# Patient Record
Sex: FEMALE | Race: WHITE | NOT HISPANIC OR LATINO | Employment: FULL TIME | URBAN - METROPOLITAN AREA
[De-identification: names, ages, dates, MRNs, and addresses within clinical notes are randomized per-mention and may not be internally consistent; named-entity substitution may affect disease eponyms.]

---

## 2024-03-05 ENCOUNTER — HOSPITAL ENCOUNTER (OUTPATIENT)
Facility: HOSPITAL | Age: 44
Setting detail: OBSERVATION
Discharge: HOME | End: 2024-03-09
Attending: EMERGENCY MEDICINE | Admitting: HOSPITALIST
Payer: COMMERCIAL

## 2024-03-05 ENCOUNTER — APPOINTMENT (OUTPATIENT)
Dept: CT IMAGING | Facility: HOSPITAL | Age: 44
End: 2024-03-05
Payer: COMMERCIAL

## 2024-03-05 DIAGNOSIS — R93.89 ABNORMAL CT SCAN: ICD-10-CM

## 2024-03-05 DIAGNOSIS — K57.92 DIVERTICULITIS: Primary | ICD-10-CM

## 2024-03-05 DIAGNOSIS — M54.50 ACUTE LEFT-SIDED LOW BACK PAIN, UNSPECIFIED WHETHER SCIATICA PRESENT: ICD-10-CM

## 2024-03-05 DIAGNOSIS — R10.30 LOWER ABDOMINAL PAIN: ICD-10-CM

## 2024-03-05 PROBLEM — F32.A DEPRESSION: Status: ACTIVE | Noted: 2024-03-05

## 2024-03-05 PROBLEM — R10.32 LLQ ABDOMINAL PAIN: Status: ACTIVE | Noted: 2024-03-05

## 2024-03-05 LAB
ALBUMIN SERPL-MCNC: 3.8 G/DL (ref 3.5–5.2)
ALBUMIN/GLOB SERPL: 1.4 G/DL
ALP SERPL-CCNC: 94 U/L (ref 39–117)
ALT SERPL W P-5'-P-CCNC: 20 U/L (ref 1–33)
ANION GAP SERPL CALCULATED.3IONS-SCNC: 10 MMOL/L (ref 5–15)
AST SERPL-CCNC: 13 U/L (ref 1–32)
BACTERIA UR QL AUTO: ABNORMAL /HPF
BASOPHILS # BLD AUTO: 0.05 10*3/MM3 (ref 0–0.2)
BASOPHILS NFR BLD AUTO: 0.5 % (ref 0–1.5)
BILIRUB SERPL-MCNC: 0.3 MG/DL (ref 0–1.2)
BILIRUB UR QL STRIP: NEGATIVE
BUN SERPL-MCNC: 5 MG/DL (ref 6–20)
BUN/CREAT SERPL: 6.6 (ref 7–25)
CALCIUM SPEC-SCNC: 9.4 MG/DL (ref 8.6–10.5)
CHLORIDE SERPL-SCNC: 103 MMOL/L (ref 98–107)
CLARITY UR: CLEAR
CO2 SERPL-SCNC: 25 MMOL/L (ref 22–29)
COLOR UR: YELLOW
CREAT SERPL-MCNC: 0.76 MG/DL (ref 0.57–1)
DEPRECATED RDW RBC AUTO: 43.4 FL (ref 37–54)
EGFRCR SERPLBLD CKD-EPI 2021: 99.9 ML/MIN/1.73
EOSINOPHIL # BLD AUTO: 0.24 10*3/MM3 (ref 0–0.4)
EOSINOPHIL NFR BLD AUTO: 2.5 % (ref 0.3–6.2)
ERYTHROCYTE [DISTWIDTH] IN BLOOD BY AUTOMATED COUNT: 12.7 % (ref 12.3–15.4)
GLOBULIN UR ELPH-MCNC: 2.8 GM/DL
GLUCOSE SERPL-MCNC: 113 MG/DL (ref 65–99)
GLUCOSE UR STRIP-MCNC: NEGATIVE MG/DL
HCG SERPL QL: NEGATIVE
HCT VFR BLD AUTO: 37.4 % (ref 34–46.6)
HGB BLD-MCNC: 12.5 G/DL (ref 12–15.9)
HGB UR QL STRIP.AUTO: NEGATIVE
HYALINE CASTS UR QL AUTO: ABNORMAL /LPF
IMM GRANULOCYTES # BLD AUTO: 0.07 10*3/MM3 (ref 0–0.05)
IMM GRANULOCYTES NFR BLD AUTO: 0.7 % (ref 0–0.5)
KETONES UR QL STRIP: NEGATIVE
LEUKOCYTE ESTERASE UR QL STRIP.AUTO: ABNORMAL
LIPASE SERPL-CCNC: 13 U/L (ref 13–60)
LYMPHOCYTES # BLD AUTO: 1.42 10*3/MM3 (ref 0.7–3.1)
LYMPHOCYTES NFR BLD AUTO: 14.9 % (ref 19.6–45.3)
MCH RBC QN AUTO: 31.3 PG (ref 26.6–33)
MCHC RBC AUTO-ENTMCNC: 33.4 G/DL (ref 31.5–35.7)
MCV RBC AUTO: 93.5 FL (ref 79–97)
MONOCYTES # BLD AUTO: 0.49 10*3/MM3 (ref 0.1–0.9)
MONOCYTES NFR BLD AUTO: 5.1 % (ref 5–12)
NEUTROPHILS NFR BLD AUTO: 7.29 10*3/MM3 (ref 1.7–7)
NEUTROPHILS NFR BLD AUTO: 76.3 % (ref 42.7–76)
NITRITE UR QL STRIP: NEGATIVE
NRBC BLD AUTO-RTO: 0 /100 WBC (ref 0–0.2)
PH UR STRIP.AUTO: 8.5 [PH] (ref 5–8)
PLATELET # BLD AUTO: 388 10*3/MM3 (ref 140–450)
PMV BLD AUTO: 9.8 FL (ref 6–12)
POTASSIUM SERPL-SCNC: 3.9 MMOL/L (ref 3.5–5.2)
PROT SERPL-MCNC: 6.6 G/DL (ref 6–8.5)
PROT UR QL STRIP: NEGATIVE
RBC # BLD AUTO: 4 10*6/MM3 (ref 3.77–5.28)
RBC # UR STRIP: ABNORMAL /HPF
REF LAB TEST METHOD: ABNORMAL
SODIUM SERPL-SCNC: 138 MMOL/L (ref 136–145)
SP GR UR STRIP: 1.01 (ref 1–1.03)
SQUAMOUS #/AREA URNS HPF: ABNORMAL /HPF
UROBILINOGEN UR QL STRIP: ABNORMAL
WBC # UR STRIP: ABNORMAL /HPF
WBC NRBC COR # BLD AUTO: 9.56 10*3/MM3 (ref 3.4–10.8)

## 2024-03-05 PROCEDURE — 99285 EMERGENCY DEPT VISIT HI MDM: CPT

## 2024-03-05 PROCEDURE — 81001 URINALYSIS AUTO W/SCOPE: CPT | Performed by: EMERGENCY MEDICINE

## 2024-03-05 PROCEDURE — 25010000002 MORPHINE PER 10 MG: Performed by: EMERGENCY MEDICINE

## 2024-03-05 PROCEDURE — 74176 CT ABD & PELVIS W/O CONTRAST: CPT

## 2024-03-05 PROCEDURE — 96376 TX/PRO/DX INJ SAME DRUG ADON: CPT

## 2024-03-05 PROCEDURE — 25010000002 ONDANSETRON PER 1 MG: Performed by: EMERGENCY MEDICINE

## 2024-03-05 PROCEDURE — 84703 CHORIONIC GONADOTROPIN ASSAY: CPT | Performed by: EMERGENCY MEDICINE

## 2024-03-05 PROCEDURE — 96375 TX/PRO/DX INJ NEW DRUG ADDON: CPT

## 2024-03-05 PROCEDURE — G0378 HOSPITAL OBSERVATION PER HR: HCPCS

## 2024-03-05 PROCEDURE — 83690 ASSAY OF LIPASE: CPT | Performed by: EMERGENCY MEDICINE

## 2024-03-05 PROCEDURE — 85025 COMPLETE CBC W/AUTO DIFF WBC: CPT | Performed by: EMERGENCY MEDICINE

## 2024-03-05 PROCEDURE — 80053 COMPREHEN METABOLIC PANEL: CPT | Performed by: EMERGENCY MEDICINE

## 2024-03-05 RX ORDER — MORPHINE SULFATE 2 MG/ML
4 INJECTION, SOLUTION INTRAMUSCULAR; INTRAVENOUS ONCE
Status: COMPLETED | OUTPATIENT
Start: 2024-03-05 | End: 2024-03-05

## 2024-03-05 RX ORDER — AMOXICILLIN 250 MG
2 CAPSULE ORAL 2 TIMES DAILY PRN
Status: DISCONTINUED | OUTPATIENT
Start: 2024-03-05 | End: 2024-03-09 | Stop reason: HOSPADM

## 2024-03-05 RX ORDER — DOXYCYCLINE 100 MG/1
100 TABLET ORAL
COMMUNITY
Start: 2024-03-02 | End: 2024-03-09 | Stop reason: HOSPADM

## 2024-03-05 RX ORDER — POLYETHYLENE GLYCOL 3350 17 G/17G
17 POWDER, FOR SOLUTION ORAL DAILY
Status: DISCONTINUED | OUTPATIENT
Start: 2024-03-05 | End: 2024-03-09 | Stop reason: HOSPADM

## 2024-03-05 RX ORDER — HYDROCODONE BITARTRATE AND ACETAMINOPHEN 5; 325 MG/1; MG/1
1 TABLET ORAL
COMMUNITY
Start: 2024-03-02 | End: 2024-04-01

## 2024-03-05 RX ORDER — SODIUM CHLORIDE 9 MG/ML
100 INJECTION, SOLUTION INTRAVENOUS CONTINUOUS
Status: DISCONTINUED | OUTPATIENT
Start: 2024-03-05 | End: 2024-03-07

## 2024-03-05 RX ORDER — PANTOPRAZOLE SODIUM 40 MG/1
40 TABLET, DELAYED RELEASE ORAL
Status: DISCONTINUED | OUTPATIENT
Start: 2024-03-06 | End: 2024-03-09 | Stop reason: HOSPADM

## 2024-03-05 RX ORDER — DICYCLOMINE HCL 20 MG
20 TABLET ORAL EVERY 6 HOURS
COMMUNITY
Start: 2024-03-02 | End: 2024-03-09 | Stop reason: HOSPADM

## 2024-03-05 RX ORDER — MORPHINE SULFATE 2 MG/ML
2 INJECTION, SOLUTION INTRAMUSCULAR; INTRAVENOUS
Status: DISCONTINUED | OUTPATIENT
Start: 2024-03-05 | End: 2024-03-09 | Stop reason: HOSPADM

## 2024-03-05 RX ORDER — ONDANSETRON 2 MG/ML
4 INJECTION INTRAMUSCULAR; INTRAVENOUS ONCE
Status: COMPLETED | OUTPATIENT
Start: 2024-03-05 | End: 2024-03-05

## 2024-03-05 RX ORDER — SODIUM CHLORIDE 9 MG/ML
40 INJECTION, SOLUTION INTRAVENOUS AS NEEDED
Status: DISCONTINUED | OUTPATIENT
Start: 2024-03-05 | End: 2024-03-09 | Stop reason: HOSPADM

## 2024-03-05 RX ORDER — MORPHINE SULFATE 2 MG/ML
4 INJECTION, SOLUTION INTRAMUSCULAR; INTRAVENOUS EVERY 4 HOURS PRN
Status: DISCONTINUED | OUTPATIENT
Start: 2024-03-05 | End: 2024-03-05

## 2024-03-05 RX ORDER — NITROGLYCERIN 0.4 MG/1
0.4 TABLET SUBLINGUAL
Status: DISCONTINUED | OUTPATIENT
Start: 2024-03-05 | End: 2024-03-09 | Stop reason: HOSPADM

## 2024-03-05 RX ORDER — ACETAMINOPHEN 160 MG/5ML
650 SOLUTION ORAL EVERY 4 HOURS PRN
Status: DISCONTINUED | OUTPATIENT
Start: 2024-03-05 | End: 2024-03-09 | Stop reason: HOSPADM

## 2024-03-05 RX ORDER — ACETAMINOPHEN 325 MG/1
650 TABLET ORAL EVERY 4 HOURS PRN
Status: DISCONTINUED | OUTPATIENT
Start: 2024-03-05 | End: 2024-03-09 | Stop reason: HOSPADM

## 2024-03-05 RX ORDER — HYDROCODONE BITARTRATE AND ACETAMINOPHEN 5; 325 MG/1; MG/1
1 TABLET ORAL EVERY 4 HOURS PRN
Status: DISCONTINUED | OUTPATIENT
Start: 2024-03-05 | End: 2024-03-09 | Stop reason: HOSPADM

## 2024-03-05 RX ORDER — PREDNISONE 10 MG/1
TABLET ORAL
Status: ON HOLD | COMMUNITY
Start: 2024-02-08 | End: 2024-03-05

## 2024-03-05 RX ORDER — ACETAMINOPHEN 650 MG/1
650 SUPPOSITORY RECTAL EVERY 4 HOURS PRN
Status: DISCONTINUED | OUTPATIENT
Start: 2024-03-05 | End: 2024-03-09 | Stop reason: HOSPADM

## 2024-03-05 RX ORDER — ONDANSETRON 4 MG/1
4 TABLET, ORALLY DISINTEGRATING ORAL
COMMUNITY
Start: 2024-03-02

## 2024-03-05 RX ORDER — SODIUM CHLORIDE 0.9 % (FLUSH) 0.9 %
10 SYRINGE (ML) INJECTION AS NEEDED
Status: DISCONTINUED | OUTPATIENT
Start: 2024-03-05 | End: 2024-03-09 | Stop reason: HOSPADM

## 2024-03-05 RX ORDER — BISACODYL 10 MG
10 SUPPOSITORY, RECTAL RECTAL DAILY PRN
Status: DISCONTINUED | OUTPATIENT
Start: 2024-03-05 | End: 2024-03-09 | Stop reason: HOSPADM

## 2024-03-05 RX ORDER — SODIUM CHLORIDE 0.9 % (FLUSH) 0.9 %
10 SYRINGE (ML) INJECTION EVERY 12 HOURS SCHEDULED
Status: DISCONTINUED | OUTPATIENT
Start: 2024-03-05 | End: 2024-03-09 | Stop reason: HOSPADM

## 2024-03-05 RX ORDER — BISACODYL 5 MG/1
5 TABLET, DELAYED RELEASE ORAL DAILY PRN
Status: DISCONTINUED | OUTPATIENT
Start: 2024-03-05 | End: 2024-03-09 | Stop reason: HOSPADM

## 2024-03-05 RX ORDER — POLYETHYLENE GLYCOL 3350 17 G/17G
17 POWDER, FOR SOLUTION ORAL DAILY PRN
Status: DISCONTINUED | OUTPATIENT
Start: 2024-03-05 | End: 2024-03-09 | Stop reason: HOSPADM

## 2024-03-05 RX ORDER — DICLOFENAC SODIUM 75 MG/1
75 TABLET, DELAYED RELEASE ORAL
COMMUNITY
Start: 2024-02-22

## 2024-03-05 RX ORDER — METRONIDAZOLE 500 MG/1
500 TABLET ORAL EVERY 8 HOURS SCHEDULED
Status: DISCONTINUED | OUTPATIENT
Start: 2024-03-05 | End: 2024-03-06

## 2024-03-05 RX ADMIN — MORPHINE SULFATE 4 MG: 2 INJECTION, SOLUTION INTRAMUSCULAR; INTRAVENOUS at 17:42

## 2024-03-05 RX ADMIN — Medication 10 ML: at 21:31

## 2024-03-05 RX ADMIN — ONDANSETRON 4 MG: 2 INJECTION INTRAMUSCULAR; INTRAVENOUS at 15:48

## 2024-03-05 RX ADMIN — MORPHINE SULFATE 4 MG: 2 INJECTION, SOLUTION INTRAMUSCULAR; INTRAVENOUS at 15:48

## 2024-03-05 NOTE — ED PROVIDER NOTES
EMERGENCY DEPARTMENT ENCOUNTER  Room Number:  DONA/DONA  PCP: Alissa Bourgeois MD  Independent Historians: Patient      HPI:  Chief Complaint: had concerns including Back Pain and Leg Pain (/).       A complete HPI/ROS/PMH/PSH/SH/FH are unobtainable due to: None    Chronic or social conditions impacting patient care (Social Determinants of Health): None      Context: Sushant Chisholm is a 43 y.o. female with a medical history of diverticulitis who presents to the ED c/o acute left-sided back and left lower quadrant abdominal pain that radiates into her anterior left leg.  She states was present upon waking this morning.  She states she was admitted to [facility] over the weekend for diverticulitis.  She is still currently on antibiotics.  She has a history of chronic back pain but does not see anyone regularly for this.  States this does not feel like her typical back pain symptoms.  She had hematuria a few days ago but has not noted it since.  Denies any vomiting diarrhea dysuria urinary frequency saddle paresthesia lower extremity weakness or bowel or bladder dysfunction.  She denies any back injury.      Review of prior external notes (non-ED) -and- Review of prior external test results outside of this encounter:  Patient evaluated at [facility] on 3/2/2024 for lower abdominal pain.  White blood cell count was 15.75, CT abdomen and pelvis right renal cyst noted.  No nephrolithiasis was noted.  Findings consistent with sigmoid diverticulitis were noted as well as a fat-containing umbilical hernia and dominant follicles on the left and right ovaries.  She was prescribed doxycycline Bentyl Zofran and Furman.        PAST MEDICAL HISTORY  Active Ambulatory Problems     Diagnosis Date Noted    Pregnant 08/25/2016    Pregnancy 09/23/2016     Resolved Ambulatory Problems     Diagnosis Date Noted    No Resolved Ambulatory Problems     Past Medical History:   Diagnosis Date    Advanced maternal  age in multigravida     Allergic rhinitis     Anxiety     Drug abuse, marijuana     Uterine mass          PAST SURGICAL HISTORY  Past Surgical History:   Procedure Laterality Date    ADENOIDECTOMY      MYRINGOTOMY W/ TUBES      TONSILLECTOMY           FAMILY HISTORY  Family History   Problem Relation Age of Onset    Diabetes Father     Breast cancer Mother     Hypertension Mother     Diabetes Mother     Kidney failure Paternal Grandmother     Breast cancer Maternal Grandmother     Colon cancer Neg Hx          SOCIAL HISTORY  Social History     Socioeconomic History    Marital status:    Tobacco Use    Smoking status: Every Day     Current packs/day: 0.25     Average packs/day: 0.3 packs/day for 28.0 years (7.0 ttl pk-yrs)     Types: Cigarettes    Smokeless tobacco: Never    Tobacco comments:     discussed working to quit from smoking   Vaping Use    Vaping status: Never Used   Substance and Sexual Activity    Alcohol use: Yes     Comment: seldom    Drug use: Yes     Types: Marijuana    Sexual activity: Yes     Partners: Male     Birth control/protection: I.U.D.     Comment: vasectomy         ALLERGIES  Penicillins, Levaquin [levofloxacin], and Metronidazole      REVIEW OF SYSTEMS  Review of Systems  Included in HPI  All systems reviewed and negative except for those discussed in HPI.      PHYSICAL EXAM    I have reviewed the triage vital signs and nursing notes.    ED Triage Vitals   Temp Heart Rate Resp BP SpO2   03/05/24 1448 03/05/24 1448 03/05/24 1448 03/05/24 1452 03/05/24 1448   98.3 °F (36.8 °C) 93 18 138/77 97 %      Temp src Heart Rate Source Patient Position BP Location FiO2 (%)   -- -- -- -- --              Physical Exam  GENERAL: alert, no acute distress  SKIN: Warm, dry  HENT: Normocephalic, atraumatic  EYES: no scleral icterus  CV: regular rhythm, regular rate  RESPIRATORY: normal effort, lungs clear  ABDOMEN: nondistended soft, tender in the left lower quadrant, normal bowel sounds, no guarding  or rigidity.  MUSCULOSKELETAL: no deformity, there is tenderness to the left lumbosacral paraspinal musculature, no tenderness on the right. Sensation is intact to light touch throughout the bilateral lower extremities. Muscle strength is 5/5 and symmetrical with plantarflexion and EHL. Patellar reflexes are 2+ and equal bilaterally. DP and PT pulses are 2+ bilaterally.     NEURO: alert, moves all extremities, follows commands            LAB RESULTS  Recent Results (from the past 24 hour(s))   Comprehensive Metabolic Panel    Collection Time: 03/05/24  3:45 PM    Specimen: Blood   Result Value Ref Range    Glucose 113 (H) 65 - 99 mg/dL    BUN 5 (L) 6 - 20 mg/dL    Creatinine 0.76 0.57 - 1.00 mg/dL    Sodium 138 136 - 145 mmol/L    Potassium 3.9 3.5 - 5.2 mmol/L    Chloride 103 98 - 107 mmol/L    CO2 25.0 22.0 - 29.0 mmol/L    Calcium 9.4 8.6 - 10.5 mg/dL    Total Protein 6.6 6.0 - 8.5 g/dL    Albumin 3.8 3.5 - 5.2 g/dL    ALT (SGPT) 20 1 - 33 U/L    AST (SGOT) 13 1 - 32 U/L    Alkaline Phosphatase 94 39 - 117 U/L    Total Bilirubin 0.3 0.0 - 1.2 mg/dL    Globulin 2.8 gm/dL    A/G Ratio 1.4 g/dL    BUN/Creatinine Ratio 6.6 (L) 7.0 - 25.0    Anion Gap 10.0 5.0 - 15.0 mmol/L    eGFR 99.9 >60.0 mL/min/1.73   Lipase    Collection Time: 03/05/24  3:45 PM    Specimen: Blood   Result Value Ref Range    Lipase 13 13 - 60 U/L   hCG, Serum, Qualitative    Collection Time: 03/05/24  3:45 PM    Specimen: Blood   Result Value Ref Range    HCG Qualitative Negative Negative   CBC Auto Differential    Collection Time: 03/05/24  3:45 PM    Specimen: Blood   Result Value Ref Range    WBC 9.56 3.40 - 10.80 10*3/mm3    RBC 4.00 3.77 - 5.28 10*6/mm3    Hemoglobin 12.5 12.0 - 15.9 g/dL    Hematocrit 37.4 34.0 - 46.6 %    MCV 93.5 79.0 - 97.0 fL    MCH 31.3 26.6 - 33.0 pg    MCHC 33.4 31.5 - 35.7 g/dL    RDW 12.7 12.3 - 15.4 %    RDW-SD 43.4 37.0 - 54.0 fl    MPV 9.8 6.0 - 12.0 fL    Platelets 388 140 - 450 10*3/mm3    Neutrophil % 76.3  (H) 42.7 - 76.0 %    Lymphocyte % 14.9 (L) 19.6 - 45.3 %    Monocyte % 5.1 5.0 - 12.0 %    Eosinophil % 2.5 0.3 - 6.2 %    Basophil % 0.5 0.0 - 1.5 %    Immature Grans % 0.7 (H) 0.0 - 0.5 %    Neutrophils, Absolute 7.29 (H) 1.70 - 7.00 10*3/mm3    Lymphocytes, Absolute 1.42 0.70 - 3.10 10*3/mm3    Monocytes, Absolute 0.49 0.10 - 0.90 10*3/mm3    Eosinophils, Absolute 0.24 0.00 - 0.40 10*3/mm3    Basophils, Absolute 0.05 0.00 - 0.20 10*3/mm3    Immature Grans, Absolute 0.07 (H) 0.00 - 0.05 10*3/mm3    nRBC 0.0 0.0 - 0.2 /100 WBC   Urinalysis With Microscopic If Indicated (No Culture) - Urine, Clean Catch    Collection Time: 03/05/24  4:26 PM    Specimen: Urine, Clean Catch   Result Value Ref Range    Color, UA Yellow Yellow, Straw    Appearance, UA Clear Clear    pH, UA 8.5 (H) 5.0 - 8.0    Specific Gravity, UA 1.007 1.005 - 1.030    Glucose, UA Negative Negative    Ketones, UA Negative Negative    Bilirubin, UA Negative Negative    Blood, UA Negative Negative    Protein, UA Negative Negative    Leuk Esterase, UA Small (1+) (A) Negative    Nitrite, UA Negative Negative    Urobilinogen, UA 0.2 E.U./dL 0.2 - 1.0 E.U./dL   Urinalysis, Microscopic Only - Urine, Clean Catch    Collection Time: 03/05/24  4:26 PM    Specimen: Urine, Clean Catch   Result Value Ref Range    RBC, UA 0-2 None Seen, 0-2 /HPF    WBC, UA 3-5 (A) None Seen, 0-2 /HPF    Bacteria, UA None Seen None Seen /HPF    Squamous Epithelial Cells, UA 3-6 (A) None Seen, 0-2 /HPF    Hyaline Casts, UA 0-2 None Seen /LPF    Methodology Manual Light Microscopy          RADIOLOGY  CT Abdomen Pelvis Without Contrast    Result Date: 3/5/2024  CT ABDOMEN AND PELVIS WITHOUT IV CONTRAST  HISTORY: Left-sided abdominal and back pain  TECHNIQUE: Radiation dose reduction techniques were utilized, including automated exposure control and exposure modulation based on body size. 3 mm images were obtained through the abdomen and pelvis without IV contrast.  COMPARISON: None   FINDINGS: Patchy groundglass and pulmonary opacification is present throughout the visualized lung bases, some areas of which have a nodular appearance. There are no findings of small bowel obstruction. The appendix is unremarkable. The liver, gallbladder, pancreas, spleen and adrenal glands have an unremarkable noncontrast CT appearance. Hypodense right renal lesion demonstrating density less than 15 Hounsfield images likely benign per Bosniak 2019 criteria. No hydronephrosis. The bladder is underdistended and not well evaluated. There is an intrauterine device.  No abdominal pelvic adenopathy by size criteria. There is colonic diverticulosis. Short segment of wall thickening with pericolonic fat stranding involves the sigmoid colon. No definite free intraperitoneal air is seen. No suspicious lytic or blastic osseous lesions.      1.  Findings of acute diverticulitis involving the sigmoid colon. Please note evaluation is suboptimal intravenous contrast. No definite free intraperitoneal air is seen. 2.  Findings most concerning for multifocal pneumonia within the bilateral lung bases, incompletely visualized. Asymmetric edema is less likely. Correlation with patient history is recommended with at least follow-up with chest CT in 6 to 8 weeks to ensure resolution given the somewhat nodular appearance of multiple areas. 3.  Other findings as above.    This report was finalized on 3/5/2024 6:38 PM by Dr. Prosper Richardson M.D on Workstation: [id number]         MEDICATIONS GIVEN IN ER  Medications   sodium chloride 0.9 % flush 10 mL (10 mL Intravenous Given 3/5/24 2131)   sodium chloride 0.9 % flush 10 mL (has no administration in time range)   sodium chloride 0.9 % infusion 40 mL (has no administration in time range)   nitroglycerin (NITROSTAT) SL tablet 0.4 mg (has no administration in time range)   sennosides-docusate (PERICOLACE) 8.6-50 MG per tablet 2 tablet (has no administration in time range)     And   polyethylene  glycol (MIRALAX) packet 17 g (has no administration in time range)     And   bisacodyl (DULCOLAX) EC tablet 5 mg (has no administration in time range)     And   bisacodyl (DULCOLAX) suppository 10 mg (has no administration in time range)   acetaminophen (TYLENOL) tablet 650 mg (has no administration in time range)     Or   acetaminophen (TYLENOL) 160 MG/5ML oral solution 650 mg (has no administration in time range)     Or   acetaminophen (TYLENOL) suppository 650 mg (has no administration in time range)   morphine injection 4 mg (has no administration in time range)   morphine injection 4 mg (4 mg Intravenous Given 3/5/24 1548)   ondansetron (ZOFRAN) injection 4 mg (4 mg Intravenous Given 3/5/24 1548)   morphine injection 4 mg (4 mg Intravenous Given 3/5/24 1742)         ORDERS PLACED DURING THIS VISIT:  Orders Placed This Encounter   Procedures    CT Abdomen Pelvis Without Contrast    Comprehensive Metabolic Panel    Lipase    hCG, Serum, Qualitative    Urinalysis With Microscopic If Indicated (No Culture) - Urine, Clean Catch    CBC Auto Differential    Urinalysis, Microscopic Only - Urine, Clean Catch    Basic Metabolic Panel    CBC Auto Differential    Diet: Liquid; Clear Liquid; Fluid Consistency: Thin (IDDSI 0)    Vital Signs    Intake & Output    Weigh Patient    Oral Care    Place Sequential Compression Device    Maintain Sequential Compression Device    Telemetry - Maintain IV Access    Telemetry - Place Orders & Notify Provider of Results When Patient Experiences Acute Chest Pain, Dysrhythmia or Respiratory Distress    May Be Off Telemetry for Tests    Pulse Oximetry, Continuous    Up With Assistance    Daily Weights    Code Status and Medical Interventions:    [facility] (on-call MD unless specified) Details    Inpatient Infectious Diseases Consult    Incentive Spirometry    Oxygen Therapy- Nasal Cannula; Titrate 1-6 LPM Per SpO2; 90 - 95%    Insert Peripheral IV    Initiate Observation Status    CBC &  Differential         OUTPATIENT MEDICATION MANAGEMENT:  Current Facility-Administered Medications Ordered in Epic   Medication Dose Route Frequency Provider Last Rate Last Admin    acetaminophen (TYLENOL) tablet 650 mg  650 mg Oral Q4H PRN Bridget Mullins APRN        Or    acetaminophen (TYLENOL) 160 MG/5ML oral solution 650 mg  650 mg Oral Q4H PRN Bridget Mullins APRN        Or    acetaminophen (TYLENOL) suppository 650 mg  650 mg Rectal Q4H PRN Bridget Mullins APRN        sennosides-docusate (PERICOLACE) 8.6-50 MG per tablet 2 tablet  2 tablet Oral BID PRN Bridget Mullins APRN        And    polyethylene glycol (MIRALAX) packet 17 g  17 g Oral Daily PRN Bridget Mullins APRN        And    bisacodyl (DULCOLAX) EC tablet 5 mg  5 mg Oral Daily PRN Bridget Mullins APRN        And    bisacodyl (DULCOLAX) suppository 10 mg  10 mg Rectal Daily PRN Bridget Mullins APRN        morphine injection 4 mg  4 mg Intravenous Q4H PRN Bridget Mullins APRN        nitroglycerin (NITROSTAT) SL tablet 0.4 mg  0.4 mg Sublingual Q5 Min PRN Brigdet Mullins APRN        sodium chloride 0.9 % flush 10 mL  10 mL Intravenous Q12H Bridget Mullins APRN   10 mL at 03/05/24 2131    sodium chloride 0.9 % flush 10 mL  10 mL Intravenous PRN Bridget Mullins APRN        sodium chloride 0.9 % infusion 40 mL  40 mL Intravenous PRN Bridget Mullins APRN         Current Outpatient Medications Ordered in Epic   Medication Sig Dispense Refill    diclofenac (VOLTAREN) 75 MG EC tablet Take 1 tablet by mouth.      dicyclomine (BENTYL) 20 MG tablet Take 1 tablet by mouth Every 6 (Six) Hours.      doxycycline (ADOXA) 100 MG tablet Take 1 tablet by mouth.      HYDROcodone-acetaminophen (NORCO) 5-325 MG per tablet Take 1 tablet by mouth.      ondansetron ODT (ZOFRAN-ODT) 4 MG disintegrating tablet Take 1 tablet by mouth.      predniSONE (DELTASONE) 10 MG tablet 3 tabs Po  bid x 3 days, then 2 tabs Po bid x 3 days, then 1 tab PO bid x 3 days, then 1 tab po daily.      sertraline (ZOLOFT) 50 MG tablet Take 1 tablet by mouth Daily.      acetaminophen (TYLENOL) 650 MG 8 hr tablet Take  by mouth.      amantadine (SYMMETREL) 100 MG tablet       cetirizine (zyrTEC) 5 MG tablet Take 5 mg by mouth Daily.      levonorgestrel (MIRENA, 52 MG,) 20 MCG/24HR IUD 1 each by Intrauterine route 1 (One) Time for 1 dose. 1 Intra Uterine Device 0         PROCEDURES  Procedures            PROGRESS, DATA ANALYSIS, CONSULTS, AND MEDICAL DECISION MAKING  All labs have been independently interpreted by me.  All radiology studies have been reviewed by me. All EKG's have been independently viewed and interpreted by me.  Discussion below represents my analysis of pertinent findings related to patient's condition, differential diagnosis, treatment plan and final disposition.    DIFFERENTIAL    Differential diagnosis includes but is not limited to:  - hepatobiliary pathology such as cholecystitis, cholangitis, and symptomatic cholelithiasis  - Pancreatitis  - Dyspepsia  - Small bowel obstruction  - Appendicitis  - Diverticulitis  - UTI including pyelonephritis  - Ureteral stone  - Zoster  - Colitis, including infectious and ischemic  - Atypical ACS        ED Course as of 03/05/24 2141   Tue Mar 05, 2024   1651 WBC: 9.56 [KA]   1651 Hemoglobin: 12.5 [KA]   1651 Glucose(!): 113 [KA]   1651 Creatinine: 0.76 [KA]   1651 Lipase: 13 [KA]   1651 HCG Qualitative: Negative [KA]   1733 Glucose(!): 113 [KA]   1733 Creatinine: 0.76 [KA]   1733 Bacteria, UA: None Seen [KA]   1733 Lipase: 13 [KA]   1733 Nitrite, UA: Negative [KA]   1733 WBC: 9.56 [KA]   1733 Hemoglobin: 12.5 [KA]   1733 HCG Qualitative: Negative [KA]   1902 I reassessed the patient, she is resting comfortably.  Her leukocytosis has resolved.  Persistent diverticulitis noted on the CT scan without perforation or abscess.  She does have some nodular abnormalities  in the lungs, recommended repeat imaging after she completes her diverticulitis treatment.  Due to patient's allergies to penicillins, fluoroquinolones and metronidazole I have discussed with the clinical pharmacist and she will review the literature to make sure we have the patient on the most appropriate antibiotic [KA]   1915 Reassessed the patient.  Discussed her previous reactions with her.  She reports a full anaphylactic action with penicillins and with Levaquin intravenously she developed itching and a red rash and same with metronidazole approximately 1 year after the Levaquin when she took it orally.  Does not have good anaerobic coverage with the doxycycline alone.  I think with her pain and tenderness she would benefit from admission for further evaluation and close monitoring with antibiotic treatment with a broader spectrum.  She is agreeable. [KA]   1956 Discussed patient care JASWINDER Carranza for [facility] including history presentation workup and she agrees to admit on behalf of Dr. Balbuena.  They will discuss and initiate antibiotics. [KA]   1957 While sleeping patient became hypoxic.  When she awakens her oxygen is normal.  2 L applied for sleep.  She is unaware if she has sleep apnea but she does not have any respiratory complaints and denies any shortness of breath [KA]      ED Course User Index  [KA] Angeles Molina PA-C             AS OF 21:41 EST VITALS:    BP - 151/91  HR - 81  TEMP - 98.3 °F (36.8 °C)  O2 SATS - 93%    COMPLEXITY OF CARE  The patient requires admission.      DIAGNOSIS  Final diagnoses:   Diverticulitis   Lower abdominal pain   Acute left-sided low back pain, unspecified whether sciatica present   Abnormal CT scan         DISPOSITION  ED Disposition       ED Disposition   Decision to Admit    Condition   --    Comment   Level of Care: Telemetry [5]   Diagnosis: Diverticulitis [234823]   Admitting Physician: JESSICA BALBUENA [[id number]]   Attending Physician: FRANCESCO  JESSICA BYRD [[id number]]                    FOLLOW UP  No follow-up provider specified.            Please note that portions of this document were completed with a voice recognition program.    Note Disclaimer: At [facility], we believe that sharing information builds trust and better relationships. You are receiving this note because you recently visited [facility]. It is possible you will see health information before a provider has talked with you about it. This kind of information can be easy to misunderstand. To help you fully understand what it means for your health, we urge you to discuss this note with your provider.         Angeles Molina PA-C  03/05/24 2244

## 2024-03-05 NOTE — ED NOTES
Pt ambulated in room on RA and O2 dropped down to 90%. Pt has been 94-96% on 2L NC. MD Gray aware.

## 2024-03-06 PROBLEM — E86.0 DEHYDRATION: Status: ACTIVE | Noted: 2024-03-06

## 2024-03-06 PROBLEM — K59.01 SLOW TRANSIT CONSTIPATION: Status: ACTIVE | Noted: 2024-03-06

## 2024-03-06 LAB
ANION GAP SERPL CALCULATED.3IONS-SCNC: 8 MMOL/L (ref 5–15)
BASOPHILS # BLD AUTO: 0.05 10*3/MM3 (ref 0–0.2)
BASOPHILS NFR BLD AUTO: 0.6 % (ref 0–1.5)
BUN SERPL-MCNC: 4 MG/DL (ref 6–20)
BUN/CREAT SERPL: 5.6 (ref 7–25)
CALCIUM SPEC-SCNC: 8.5 MG/DL (ref 8.6–10.5)
CHLORIDE SERPL-SCNC: 108 MMOL/L (ref 98–107)
CO2 SERPL-SCNC: 24 MMOL/L (ref 22–29)
CREAT SERPL-MCNC: 0.71 MG/DL (ref 0.57–1)
DEPRECATED RDW RBC AUTO: 42.2 FL (ref 37–54)
EGFRCR SERPLBLD CKD-EPI 2021: 108.3 ML/MIN/1.73
EOSINOPHIL # BLD AUTO: 0.37 10*3/MM3 (ref 0–0.4)
EOSINOPHIL NFR BLD AUTO: 4.7 % (ref 0.3–6.2)
ERYTHROCYTE [DISTWIDTH] IN BLOOD BY AUTOMATED COUNT: 12.5 % (ref 12.3–15.4)
GLUCOSE SERPL-MCNC: 90 MG/DL (ref 65–99)
HCT VFR BLD AUTO: 36 % (ref 34–46.6)
HGB BLD-MCNC: 12 G/DL (ref 12–15.9)
IMM GRANULOCYTES # BLD AUTO: 0.03 10*3/MM3 (ref 0–0.05)
IMM GRANULOCYTES NFR BLD AUTO: 0.4 % (ref 0–0.5)
LYMPHOCYTES # BLD AUTO: 2.51 10*3/MM3 (ref 0.7–3.1)
LYMPHOCYTES NFR BLD AUTO: 32.2 % (ref 19.6–45.3)
MCH RBC QN AUTO: 31.3 PG (ref 26.6–33)
MCHC RBC AUTO-ENTMCNC: 33.3 G/DL (ref 31.5–35.7)
MCV RBC AUTO: 93.8 FL (ref 79–97)
MONOCYTES # BLD AUTO: 0.52 10*3/MM3 (ref 0.1–0.9)
MONOCYTES NFR BLD AUTO: 6.7 % (ref 5–12)
NEUTROPHILS NFR BLD AUTO: 4.32 10*3/MM3 (ref 1.7–7)
NEUTROPHILS NFR BLD AUTO: 55.4 % (ref 42.7–76)
NRBC BLD AUTO-RTO: 0 /100 WBC (ref 0–0.2)
PLATELET # BLD AUTO: 357 10*3/MM3 (ref 140–450)
PMV BLD AUTO: 9.3 FL (ref 6–12)
POTASSIUM SERPL-SCNC: 3.6 MMOL/L (ref 3.5–5.2)
RBC # BLD AUTO: 3.84 10*6/MM3 (ref 3.77–5.28)
SODIUM SERPL-SCNC: 140 MMOL/L (ref 136–145)
WBC NRBC COR # BLD AUTO: 7.8 10*3/MM3 (ref 3.4–10.8)

## 2024-03-06 PROCEDURE — 25810000003 SODIUM CHLORIDE 0.9 % SOLUTION: Performed by: HOSPITALIST

## 2024-03-06 PROCEDURE — 25810000003 SODIUM CHLORIDE 0.9 % SOLUTION: Performed by: INTERNAL MEDICINE

## 2024-03-06 PROCEDURE — 25010000002 METRONIDAZOLE 500 MG/100ML SOLUTION: Performed by: SURGERY

## 2024-03-06 PROCEDURE — 96376 TX/PRO/DX INJ SAME DRUG ADON: CPT

## 2024-03-06 PROCEDURE — 25010000002 VANCOMYCIN 10 G RECONSTITUTED SOLUTION: Performed by: INTERNAL MEDICINE

## 2024-03-06 PROCEDURE — 96375 TX/PRO/DX INJ NEW DRUG ADDON: CPT

## 2024-03-06 PROCEDURE — 25010000002 MORPHINE PER 10 MG: Performed by: HOSPITALIST

## 2024-03-06 PROCEDURE — 85025 COMPLETE CBC W/AUTO DIFF WBC: CPT | Performed by: NURSE PRACTITIONER

## 2024-03-06 PROCEDURE — 96367 TX/PROPH/DG ADDL SEQ IV INF: CPT

## 2024-03-06 PROCEDURE — 96365 THER/PROPH/DIAG IV INF INIT: CPT

## 2024-03-06 PROCEDURE — 25010000002 PROCHLORPERAZINE 10 MG/2ML SOLUTION: Performed by: INTERNAL MEDICINE

## 2024-03-06 PROCEDURE — 96366 THER/PROPH/DIAG IV INF ADDON: CPT

## 2024-03-06 PROCEDURE — 36415 COLL VENOUS BLD VENIPUNCTURE: CPT | Performed by: NURSE PRACTITIONER

## 2024-03-06 PROCEDURE — 25010000002 AZTREONAM PER 500 MG: Performed by: HOSPITALIST

## 2024-03-06 PROCEDURE — G0378 HOSPITAL OBSERVATION PER HR: HCPCS

## 2024-03-06 PROCEDURE — 80048 BASIC METABOLIC PNL TOTAL CA: CPT | Performed by: NURSE PRACTITIONER

## 2024-03-06 RX ORDER — METRONIDAZOLE 500 MG/100ML
500 INJECTION, SOLUTION INTRAVENOUS EVERY 8 HOURS
Status: DISCONTINUED | OUTPATIENT
Start: 2024-03-06 | End: 2024-03-08

## 2024-03-06 RX ORDER — PROCHLORPERAZINE EDISYLATE 5 MG/ML
5 INJECTION INTRAMUSCULAR; INTRAVENOUS EVERY 6 HOURS PRN
Status: DISCONTINUED | OUTPATIENT
Start: 2024-03-06 | End: 2024-03-09 | Stop reason: HOSPADM

## 2024-03-06 RX ORDER — VANCOMYCIN/0.9 % SOD CHLORIDE 1.5G/250ML
20 PLASTIC BAG, INJECTION (ML) INTRAVENOUS ONCE
Status: COMPLETED | OUTPATIENT
Start: 2024-03-06 | End: 2024-03-06

## 2024-03-06 RX ORDER — ONDANSETRON 2 MG/ML
4 INJECTION INTRAMUSCULAR; INTRAVENOUS EVERY 6 HOURS PRN
Status: DISCONTINUED | OUTPATIENT
Start: 2024-03-06 | End: 2024-03-09 | Stop reason: HOSPADM

## 2024-03-06 RX ADMIN — PANTOPRAZOLE SODIUM 40 MG: 40 TABLET, DELAYED RELEASE ORAL at 06:16

## 2024-03-06 RX ADMIN — SERTRALINE 50 MG: 50 TABLET, FILM COATED ORAL at 09:28

## 2024-03-06 RX ADMIN — HYDROCODONE BITARTRATE AND ACETAMINOPHEN 1 TABLET: 5; 325 TABLET ORAL at 20:15

## 2024-03-06 RX ADMIN — SODIUM CHLORIDE 100 ML/HR: 9 INJECTION, SOLUTION INTRAVENOUS at 01:06

## 2024-03-06 RX ADMIN — AZTREONAM 2000 MG: 2 INJECTION, POWDER, LYOPHILIZED, FOR SOLUTION INTRAMUSCULAR; INTRAVENOUS at 15:46

## 2024-03-06 RX ADMIN — AZTREONAM 2000 MG: 2 INJECTION, POWDER, LYOPHILIZED, FOR SOLUTION INTRAMUSCULAR; INTRAVENOUS at 23:20

## 2024-03-06 RX ADMIN — AZTREONAM 2000 MG: 2 INJECTION, POWDER, LYOPHILIZED, FOR SOLUTION INTRAMUSCULAR; INTRAVENOUS at 01:05

## 2024-03-06 RX ADMIN — PROCHLORPERAZINE EDISYLATE 5 MG: 5 INJECTION INTRAMUSCULAR; INTRAVENOUS at 13:37

## 2024-03-06 RX ADMIN — SODIUM CHLORIDE 100 ML/HR: 9 INJECTION, SOLUTION INTRAVENOUS at 09:29

## 2024-03-06 RX ADMIN — METRONIDAZOLE 500 MG: 500 TABLET, FILM COATED ORAL at 01:05

## 2024-03-06 RX ADMIN — POLYETHYLENE GLYCOL 3350 17 G: 17 POWDER, FOR SOLUTION ORAL at 09:42

## 2024-03-06 RX ADMIN — VANCOMYCIN HYDROCHLORIDE 1500 MG: 10 INJECTION, POWDER, LYOPHILIZED, FOR SOLUTION INTRAVENOUS at 09:28

## 2024-03-06 RX ADMIN — Medication 10 ML: at 09:29

## 2024-03-06 RX ADMIN — MORPHINE SULFATE 2 MG: 2 INJECTION, SOLUTION INTRAMUSCULAR; INTRAVENOUS at 09:41

## 2024-03-06 RX ADMIN — Medication 10 ML: at 21:08

## 2024-03-06 RX ADMIN — METRONIDAZOLE 500 MG: 500 TABLET, FILM COATED ORAL at 06:16

## 2024-03-06 RX ADMIN — METRONIDAZOLE 500 MG: 500 INJECTION, SOLUTION INTRAVENOUS at 20:03

## 2024-03-06 RX ADMIN — VANCOMYCIN HYDROCHLORIDE 1250 MG: 10 INJECTION, POWDER, LYOPHILIZED, FOR SOLUTION INTRAVENOUS at 21:08

## 2024-03-06 RX ADMIN — MORPHINE SULFATE 2 MG: 2 INJECTION, SOLUTION INTRAMUSCULAR; INTRAVENOUS at 01:06

## 2024-03-06 RX ADMIN — AZTREONAM 2000 MG: 2 INJECTION, POWDER, LYOPHILIZED, FOR SOLUTION INTRAMUSCULAR; INTRAVENOUS at 09:36

## 2024-03-06 RX ADMIN — METRONIDAZOLE 500 MG: 500 TABLET, FILM COATED ORAL at 13:37

## 2024-03-06 RX ADMIN — POLYETHYLENE GLYCOL 3350 17 G: 17 POWDER, FOR SOLUTION ORAL at 01:05

## 2024-03-06 NOTE — PROGRESS NOTES
"Chart round  Infectious Diseases Progress Note    Pretty Heaton MD     [facility]  [place]: 0 days  Patient Identification:  Name: Sushant Lala  Age: 43 y.o.  Sex: female  :  1980  MRN: [id number]         Primary Care Physician: Alissa Bourgeois MD        Subjective: Feeling somewhat better tolerating antibiotics without any problem.  Still have abdominal discomfort.  Awaiting surgical evaluation.  Interval History: See initial consultation note.    Objective:    Scheduled Meds:aztreonam, 2,000 mg, Intravenous, Q8H  metroNIDAZOLE, 500 mg, Oral, Q8H  pantoprazole, 40 mg, Oral, Q AM  polyethylene glycol, 17 g, Oral, Daily  sertraline, 50 mg, Oral, Daily  sodium chloride, 10 mL, Intravenous, Q12H  vancomycin, 1,250 mg, Intravenous, Q12H      Continuous Infusions:Pharmacy to dose vancomycin,   sodium chloride, 100 mL/hr, Last Rate: 100 mL/hr (24 1546)        Vital signs in last 24 hours:  Temp:  [97.3 °F (36.3 °C)-98.4 °F (36.9 °C)] 97.3 °F (36.3 °C)  Heart Rate:  [] 102  Resp:  [18] 18  BP: (122-164)/(70-99) 134/85    Intake/Output:    Intake/Output Summary (Last 24 hours) at 3/6/2024 1547  Last data filed at 3/6/2024 1405  Gross per 24 hour   Intake 360 ml   Output --   Net 360 ml       Exam: Examination from other providers reviewed  /85 (BP Location: Right arm, Patient Position: Sitting)   Pulse 102   Temp 97.3 °F (36.3 °C) (Oral)   Resp 18   Ht 157.5 cm (62\")   Wt 76.8 kg (169 lb 6.4 oz)   LMP  (LMP Unknown)   SpO2 95%   BMI 30.98 kg/m²   Patient is examined using the personal protective equipment as per guidelines from infection control for this particular patient as enacted.  Hand washing was performed before and after patient interaction.  General Appearance:  Appears to have a better color and comfortable                          Head:    Normocephalic, without obvious abnormality, atraumatic                           Eyes:    PERRL, conjunctivae/corneas clear, " EOM's intact, both eyes                         Throat:   Lips, tongue, gums normal; oral mucosa pink and moist                           Neck:   Supple, symmetrical, trachea midline, no JVD                         Lungs:    Clear to auscultation bilaterally, respirations unlabored                 Chest Wall:    No tenderness or deformity                          Heart:  S1-S2 regular                  Abdomen:   Softer compared to 24 hours ago with mild generalized tenderness no significant guarding rigidity or rebound noted                 Extremities:   Extremities normal, atraumatic, no cyanosis or edema                        Pulses:   Pulses palpable in all extremities                            Skin:   Skin is warm and dry,  no rashes or palpable lesions                  Neurologic: Awake alert interactive no focal abnormalities       Data Review:    I reviewed the patient's new clinical results.  Results from last 7 days   Lab Units 03/06/24  0535 03/05/24  1545 03/02/24  1018   WBC 10*3/mm3 7.80 9.56 15.75*   HEMOGLOBIN g/dL 12.0 12.5 13.7   PLATELETS 10*3/mm3 357 388 380     Results from last 7 days   Lab Units 03/06/24  0535 03/05/24  1545   SODIUM mmol/L 140 138   POTASSIUM mmol/L 3.6 3.9   CHLORIDE mmol/L 108* 103   CO2 mmol/L 24.0 25.0   BUN mg/dL 4* 5*   CREATININE mg/dL 0.71 0.76   CALCIUM mg/dL 8.5* 9.4   GLUCOSE mg/dL 90 113*     Microbiology Results (last 10 days)       ** No results found for the last 240 hours. **              Assessment:    LLQ abdominal pain    Diverticulitis    Depression    Dehydration    Slow transit constipation  1-acute sigmoid diverticulitis with recurrent episodes in the past  2-abnormal lung imaging concerning for multifocal pneumonia  3-multiple antibiotic allergies and intolerance  4-possible primary lumbar process with radiculopathy  5-other diagnoses per primary team.     Recommendations/Discussions:  See my discussion and recommendations on 3/5/2024.  Continue  present antibiotics and follow her clinical course and improvement pattern and any reaction as she has multiple intolerances of antibiotic therapy.  Follow-up on general surgery's recommendation and hopefully de-escalate antibiotic therapy based on culture results response to treatment.  Side effects of recurrent antibiotic therapy to patient is explained including possibility of nausea vomiting diarrhea rash hepatic and renal dysfunctions cytopenias fever selection of resistant pathogens and infection due to yeast and C. difficile.  Patient understands these risks and wants to proceed.  Pretty Heaton MD  3/6/2024  15:47 EST    Parts of this note may be an electronic transcription/translation of spoken language to printed text using the Dragon dictation system.

## 2024-03-06 NOTE — CONSULTS
CONSULT NOTE    Infectious Diseases - Pretty Rizo MD  [facility]       Patient Identification:  Name: Sushant Chisholm  Age: 43 y.o.  Sex: female  :  1980  MRN: [id number]             Date of Consultation: 3/5/2024      Primary Care Physician: Alissa Bourgeois MD                               Requesting Physician: JASWINDER Lopez  Reason for Consultation: Antibiotic therapy recommendation and the patient with acute recurrent diverticulitis and intolerance to multiple antibiotic classes and failure of treatment on doxycycline.    History of presenting illness: Patient is a 43-year-old female who has had multiple episodes of diverticulitis since  with a frequency of at least 2 episodes a year with last episode in 2023 which was treated by her doctor with oral vancomycin at that time with improvement was in her usual state of her health until last week when she developed lower abdominal discomfort with low back pain.  She was at the [facility] and was treated with doxycycline without improvement with worsening left-sided back pain and pain going into her left leg.  Patient significant abdominal pain and decreased appetite in the last few days.  Because of her lack of improvement and the symptoms getting worse with pain going into her left leg patient came to our facility.  Because of her multiple antibiotic allergy discussion take place about the various antibiotic treatment that could be offered for diverticulitis noted on the CT scan of the abdomen and pelvis.  Sent a text message to patient's caring APRN about various antibiotic regimen and recommended Azactam and vancomycin and Flagyl for the time being while closely monitoring her clinical course and tolerance to these regimen.  Her left lower back pain and pain radiating into her leg needs to be evaluated for superimposed primary lumbar spine issues with radiculopathy.  Subsequently her case was discussed with  patient's attending Dr. Tesfaye and again discussion took place between use of ertapenem or combination of Azactam and vancomycin and Flagyl.  Patient herself recalls having a colonoscopy long time ago but does not remember why was not done and who did it but it is more than 15 years.  She is also unsure why she had a colonoscopy when she was in her mid 20s and early 30s.  Impression:    This presentation is consistent with:  1-acute sigmoid diverticulitis with recurrent episodes in the past  2-abnormal lung imaging concerning for multifocal pneumonia  3-multiple antibiotic allergies and intolerance  4-possible primary lumbar process with radiculopathy  5-other diagnoses per primary team.    Recommendations/Discussions:  At this juncture would like to to continue with IV vancomycin, Azactam and Flagyl while monitoring her abdominal symptoms and progression of her diverticulitis.  It appears that doxycycline would have been effective given the improvement in white blood cell count compared to 3 days ago but because of the persistent CT scan imaging and clinical symptoms it is not unreasonable to try IV antibiotic therapy.  Ertapenem is reasonable but would not be the first choice in the setting of intra-abdominal infection with intolerance to penicillin as not only it would cross-react with penicillin but also ertapenem does not cover Enterococcus.  Supportive care and management of other issues including further workup for primary lumbar process with radiculopathy per primary team.  Overall concept of care discussed with JASWINDER Lopez as well as Dr. Tesfaye.            Past Medical History:  Past Medical History:   Diagnosis Date    Advanced maternal age in multigravida     Allergic rhinitis     Anxiety     Drug abuse, marijuana     quit after found out pregnant    Uterine mass     13 yo     Past Surgical History:  Past Surgical History:   Procedure Laterality Date    ADENOIDECTOMY      MYRINGOTOMY W/  TUBES      TONSILLECTOMY        Home Meds:  (Not in a hospital admission)    Current Meds:     Current Facility-Administered Medications:     acetaminophen (TYLENOL) tablet 650 mg, 650 mg, Oral, Q4H PRN **OR** acetaminophen (TYLENOL) 160 MG/5ML oral solution 650 mg, 650 mg, Oral, Q4H PRN **OR** acetaminophen (TYLENOL) suppository 650 mg, 650 mg, Rectal, Q4H PRN, Bridget Mullins APRN    sennosides-docusate (PERICOLACE) 8.6-50 MG per tablet 2 tablet, 2 tablet, Oral, BID PRN **AND** polyethylene glycol (MIRALAX) packet 17 g, 17 g, Oral, Daily PRN **AND** bisacodyl (DULCOLAX) EC tablet 5 mg, 5 mg, Oral, Daily PRN **AND** bisacodyl (DULCOLAX) suppository 10 mg, 10 mg, Rectal, Daily PRN, Bridget Mullins APRN    morphine injection 4 mg, 4 mg, Intravenous, Q4H PRN, Bridget Mullins APRN    nitroglycerin (NITROSTAT) SL tablet 0.4 mg, 0.4 mg, Sublingual, Q5 Min PRN, Bridget Mullins APRN    sodium chloride 0.9 % flush 10 mL, 10 mL, Intravenous, Q12H, Bridget Mullins APRN    sodium chloride 0.9 % flush 10 mL, 10 mL, Intravenous, PRN, Bridget Mullins APRN    sodium chloride 0.9 % infusion 40 mL, 40 mL, Intravenous, PRN, Bridget Mullins APRN    Current Outpatient Medications:     diclofenac (VOLTAREN) 75 MG EC tablet, Take 1 tablet by mouth., Disp: , Rfl:     dicyclomine (BENTYL) 20 MG tablet, Take 1 tablet by mouth Every 6 (Six) Hours., Disp: , Rfl:     doxycycline (ADOXA) 100 MG tablet, Take 1 tablet by mouth., Disp: , Rfl:     HYDROcodone-acetaminophen (NORCO) 5-325 MG per tablet, Take 1 tablet by mouth., Disp: , Rfl:     ondansetron ODT (ZOFRAN-ODT) 4 MG disintegrating tablet, Take 1 tablet by mouth., Disp: , Rfl:     predniSONE (DELTASONE) 10 MG tablet, 3 tabs Po bid x 3 days, then 2 tabs Po bid x 3 days, then 1 tab PO bid x 3 days, then 1 tab po daily., Disp: , Rfl:     sertraline (ZOLOFT) 50 MG tablet, Take 1 tablet by mouth Daily., Disp: , Rfl:     acetaminophen  (TYLENOL) 650 MG 8 hr tablet, Take  by mouth., Disp: , Rfl:     amantadine (SYMMETREL) 100 MG tablet, , Disp: , Rfl:     cetirizine (zyrTEC) 5 MG tablet, Take 5 mg by mouth Daily., Disp: , Rfl:     levonorgestrel (MIRENA, 52 MG,) 20 MCG/24HR IUD, 1 each by Intrauterine route 1 (One) Time for 1 dose., Disp: 1 Intra Uterine Device, Rfl: 0  Allergies:  Allergies   Allergen Reactions    Penicillins Anaphylaxis    Levaquin [Levofloxacin] Rash    Metronidazole Rash     Social History:   Social History     Tobacco Use    Smoking status: Every Day     Current packs/day: 0.25     Average packs/day: 0.3 packs/day for 28.0 years (7.0 ttl pk-yrs)     Types: Cigarettes    Smokeless tobacco: Never    Tobacco comments:     discussed working to quit from smoking   Substance Use Topics    Alcohol use: Yes     Comment: seldom      Family History:  Family History   Problem Relation Age of Onset    Diabetes Father     Breast cancer Mother     Hypertension Mother     Diabetes Mother     Kidney failure Paternal Grandmother     Breast cancer Maternal Grandmother     Colon cancer Neg Hx           Review of Systems  See history of present illness and past medical history.  As described in history presenting illness.      Vitals:   /99   Pulse 80   Temp 98.3 °F (36.8 °C)   Resp 18   SpO2 96%   I/O: No intake or output data in the 24 hours ending 03/05/24 2046  Exam:  Patient is examined using the personal protective equipment as per guidelines from infection control for this particular patient as enacted.  Hand washing was performed before and after patient interaction.  General Appearance:  Alert cooperative uncomfortable female who is able to ambulate but gingerly.   Head:    Normocephalic, without obvious abnormality, atraumatic   Eyes:    PERRL, conjunctivae/corneas clear, EOM's intact, both eyes   Ears:    Normal external ear canals, both ears   Nose:   Nares normal, septum midline, mucosa normal, no drainage    or sinus  tenderness   Throat:   Lips, tongue, gums normal; oral mucosa pink and moist   Neck:   Supple, symmetrical, trachea midline, no adenopathy;     thyroid:  no enlargement/tenderness/nodules; no carotid    bruit or JVD   Back:     Symmetric, no curvature, ROM normal, no CVA tenderness   Lungs:   Decreased breath sounds at the bases   Chest Wall:    No tenderness or deformity    Heart:  S1-S2 regular   Abdomen:   Soft generalized tenderness noted no rebound noted bowel sounds positive   Extremities:   Extremities normal, atraumatic, no cyanosis or edema   Pulses:   Pulses palpable in all extremities; symmetric all extremities   Skin:   Skin color normal, Skin is warm and dry,  no rashes or palpable lesions   Neurologic: Grossly nonfocal and able to ambulate       Data Review:    I reviewed the patient's new clinical results.  Results from last 7 days   Lab Units 03/05/24  1545 03/02/24  1018   WBC 10*3/mm3 9.56 15.75*   HEMOGLOBIN g/dL 12.5 13.7   PLATELETS 10*3/mm3 388 380     Results from last 7 days   Lab Units 03/05/24  1545   SODIUM mmol/L 138   POTASSIUM mmol/L 3.9   CHLORIDE mmol/L 103   CO2 mmol/L 25.0   BUN mg/dL 5*   CREATININE mg/dL 0.76   CALCIUM mg/dL 9.4   GLUCOSE mg/dL 113*     Microbiology Results (last 10 days)       ** No results found for the last 240 hours. **          CT Abdomen Pelvis Without Contrast    Result Date: 3/5/2024  1.  Findings of acute diverticulitis involving the sigmoid colon. Please note evaluation is suboptimal intravenous contrast. No definite free intraperitoneal air is seen. 2.  Findings most concerning for multifocal pneumonia within the bilateral lung bases, incompletely visualized. Asymmetric edema is less likely. Correlation with patient history is recommended with at least follow-up with chest CT in 6 to 8 weeks to ensure resolution given the somewhat nodular appearance of multiple areas. 3.  Other findings as above.    This report was finalized on 3/5/2024 6:38 PM by   Prosper Richardson M.D on Workstation: [id number]         Assessment:  Active Hospital Problems    Diagnosis  POA    **Diverticulitis [K57.92]  Yes      Resolved Hospital Problems   No resolved problems to display.         Plan:  See above  Pretty Heaton MD   3/5/2024  20:46 EST    Parts of this note may be an electronic transcription/translation of spoken language to printed text using the Dragon dictation system.

## 2024-03-06 NOTE — PROGRESS NOTES
"[facility] Clinical Pharmacy Services: Vancomycin Pharmacokinetic Initial Consult Note    Sushant Lala is a 43 y.o. female who is on day 1 of pharmacy to dose vancomycin.    Indication: Intra-Abdominal Infection  Consulting Provider: Dr Heaton  Planned Duration of Therapy: 7 days  Loading Dose Ordered or Given: 1500 mg on 3/6 at 0845  Culture/Source:   Target: -600 mg/L.hr   Other Antimicrobials: Flagyl + azactam    Vitals/Labs  Ht: 157.5 cm (62\"); Wt: 76.8 kg (169 lb 6.4 oz)  Temp Readings from Last 1 Encounters:   03/06/24 97.7 °F (36.5 °C) (Oral)    Estimated Creatinine Clearance: 98.1 mL/min (by C-G formula based on SCr of 0.71 mg/dL).       Results from last 7 days   Lab Units 03/06/24  0535 03/05/24  1545 03/02/24  1018   CREATININE mg/dL 0.71 0.76  --    WBC 10*3/mm3 7.80 9.56 15.75*     Assessment/Plan:    Vancomycin Dose:   1250 mg IV every  12  hours  Predictive AUC level for the dose ordered is 535 mg/L.hr, which is within the target of 400-600 mg/L.hr  Vanc Trough has been ordered for 3/8 at 0830     Pharmacy will follow patient's kidney function and will adjust doses and obtain levels as necessary. Thank you for involving pharmacy in this patient's care. Please contact pharmacy with any questions or concerns.                           Meagan Womack, PharmD  Clinical Pharmacist    "

## 2024-03-06 NOTE — SIGNIFICANT NOTE
03/06/24 1149   OTHER   Discipline physical therapist   Rehab Time/Intention   Session Not Performed other (see comments)  (pt up ad pallavi, denies any  mobility issues at this time. No acute PT needs. will sign off.)

## 2024-03-06 NOTE — H&P
Patient Name:  Sushant Chisholm  YOB: 1980  MRN:  [id number]  Admit Date:  3/5/2024  Patient Care Team:  Alissa Bourgeois MD as PCP - General (Internal Medicine)      Subjective   History Present Illness     Chief Complaint   Patient presents with    Back Pain    Leg Pain              Ms. Chisholm is a 43 y.o. female with history of several prior bouts of diverticulitis presenting with left lower quadrant abdominal pain.  She was seen in ED at [facility] 3 days ago with the same complaints, and was diagnosed with diverticulitis.  She was discharged home on doxycycline as she does carry listed allergies to penicillin, Levaquin and Flagyl.  Since getting home she has not felt well, continue to have left lower quadrant pain.  She reports it radiates down into her left upper thigh area.  At times it does hurt in the left flank and back as well.  She presented back to the ED today where she actually has significant improvement on her lab work compared to the labs at [facility] but imaging still shows acute diverticulitis involving the sigmoid colon along with some concern for multifocal pneumonia bilaterally although she has no respiratory complaints.  She received some morphine in ER but says it is starting to wear off and she is now complaining of more pain.  She reports some constipation and she has not really had a good bowel movement for a couple of days although she did go a little bit this morning.      Review of Systems   Constitutional:  Negative for chills, fatigue and fever.   HENT:  Negative for congestion and trouble swallowing.    Eyes:  Negative for visual disturbance.   Respiratory:  Negative for cough, chest tightness and shortness of breath.    Cardiovascular:  Negative for chest pain, palpitations and leg swelling.   Gastrointestinal:  Positive for abdominal pain, constipation and nausea. Negative for abdominal distention, diarrhea and vomiting.   Genitourinary:  Negative for  difficulty urinating, dysuria and frequency.   Musculoskeletal:  Negative for arthralgias.   Skin:  Negative for rash.   Neurological:  Negative for dizziness and headaches.   Psychiatric/Behavioral:  Negative for confusion.         Personal History     Past Medical History:   Diagnosis Date    Advanced maternal age in multigravida     Allergic rhinitis     Anxiety     Drug abuse, marijuana     quit after found out pregnant    Uterine mass     11 yo     Past Surgical History:   Procedure Laterality Date    ADENOIDECTOMY      MYRINGOTOMY W/ TUBES      TONSILLECTOMY       Family History   Problem Relation Age of Onset    Diabetes Father     Breast cancer Mother     Hypertension Mother     Diabetes Mother     Kidney failure Paternal Grandmother     Breast cancer Maternal Grandmother     Colon cancer Neg Hx      Social History     Tobacco Use    Smoking status: Every Day     Current packs/day: 0.25     Average packs/day: 0.3 packs/day for 28.0 years (7.0 ttl pk-yrs)     Types: Cigarettes    Smokeless tobacco: Never    Tobacco comments:     discussed working to quit from smoking   Vaping Use    Vaping status: Never Used   Substance Use Topics    Alcohol use: Yes     Comment: seldom    Drug use: Yes     Types: Marijuana     No current facility-administered medications on file prior to encounter.     Current Outpatient Medications on File Prior to Encounter   Medication Sig Dispense Refill    diclofenac (VOLTAREN) 75 MG EC tablet Take 1 tablet by mouth.      dicyclomine (BENTYL) 20 MG tablet Take 1 tablet by mouth Every 6 (Six) Hours.      doxycycline (ADOXA) 100 MG tablet Take 1 tablet by mouth.      HYDROcodone-acetaminophen (NORCO) 5-325 MG per tablet Take 1 tablet by mouth.      ondansetron ODT (ZOFRAN-ODT) 4 MG disintegrating tablet Take 1 tablet by mouth.      predniSONE (DELTASONE) 10 MG tablet 3 tabs Po bid x 3 days, then 2 tabs Po bid x 3 days, then 1 tab PO bid x 3 days, then 1 tab po daily.      sertraline  (ZOLOFT) 50 MG tablet Take 1 tablet by mouth Daily.      acetaminophen (TYLENOL) 650 MG 8 hr tablet Take  by mouth.      amantadine (SYMMETREL) 100 MG tablet       cetirizine (zyrTEC) 5 MG tablet Take 5 mg by mouth Daily.      levonorgestrel (MIRENA, 52 MG,) 20 MCG/24HR IUD 1 each by Intrauterine route 1 (One) Time for 1 dose. 1 Intra Uterine Device 0     Allergies   Allergen Reactions    Penicillins Anaphylaxis    Levaquin [Levofloxacin] Rash    Metronidazole Rash       Objective    Objective     Vital Signs  Temp:  [98.3 °F (36.8 °C)-98.4 °F (36.9 °C)] 98.4 °F (36.9 °C)  Heart Rate:  [71-93] 74  Resp:  [18] 18  BP: (122-164)/(72-99) 145/83  SpO2:  [91 %-98 %] 98 %  on  Flow (L/min):  [2] 2;   Device (Oxygen Therapy): nasal cannula  Body mass index is 30.98 kg/m².    Physical Exam  Vitals and nursing note reviewed.   Constitutional:       General: She is not in acute distress.  HENT:      Head: Normocephalic and atraumatic.      Mouth/Throat:      Mouth: Mucous membranes are moist.      Pharynx: No posterior oropharyngeal erythema.   Eyes:      General: No scleral icterus.  Neck:      Vascular: No JVD.   Cardiovascular:      Rate and Rhythm: Normal rate and regular rhythm.      Pulses: Normal pulses.      Heart sounds: Normal heart sounds. No murmur heard.  Pulmonary:      Effort: Pulmonary effort is normal. No respiratory distress.      Breath sounds: Normal breath sounds.   Abdominal:      General: Bowel sounds are normal. There is no distension.      Palpations: Abdomen is soft.      Tenderness: There is abdominal tenderness (LLQ). There is no guarding or rebound.   Musculoskeletal:         General: No swelling or tenderness.      Cervical back: Neck supple.   Skin:     General: Skin is warm and dry.      Coloration: Skin is not jaundiced.      Findings: No rash.   Neurological:      Mental Status: She is alert and oriented to person, place, and time.   Psychiatric:         Mood and Affect: Mood normal.          Behavior: Behavior normal.         Results Review:  I reviewed the patient's new clinical results.  I reviewed the patient's new imaging results and agree with the interpretation.  I reviewed the patient's other test results and agree with the interpretation  I personally viewed and interpreted the patient's EKG/Telemetry data  Discussed with ED provider.    Lab Results (last 24 hours)       Procedure Component Value Units Date/Time    CBC & Differential [[id number]]  (Abnormal) Collected: 03/05/24 1545    Specimen: Blood Updated: 03/05/24 1642    Narrative:      The following orders were created for panel order CBC & Differential.  Procedure                               Abnormality         Status                     ---------                               -----------         ------                     CBC Auto Differential[[id number]]        Abnormal            Final result                 Please view results for these tests on the individual orders.    Comprehensive Metabolic Panel [[id number]]  (Abnormal) Collected: 03/05/24 1545    Specimen: Blood Updated: 03/05/24 1631     Glucose 113 mg/dL      BUN 5 mg/dL      Creatinine 0.76 mg/dL      Sodium 138 mmol/L      Potassium 3.9 mmol/L      Chloride 103 mmol/L      CO2 25.0 mmol/L      Calcium 9.4 mg/dL      Total Protein 6.6 g/dL      Albumin 3.8 g/dL      ALT (SGPT) 20 U/L      AST (SGOT) 13 U/L      Alkaline Phosphatase 94 U/L      Total Bilirubin 0.3 mg/dL      Globulin 2.8 gm/dL      A/G Ratio 1.4 g/dL      BUN/Creatinine Ratio 6.6     Anion Gap 10.0 mmol/L      eGFR 99.9 mL/min/1.73     Narrative:      GFR Normal >60  Chronic Kidney Disease <60  Kidney Failure <15      Lipase [[id number]]  (Normal) Collected: 03/05/24 1545    Specimen: Blood Updated: 03/05/24 1631     Lipase 13 U/L     hCG, Serum, Qualitative [[id number]]  (Normal) Collected: 03/05/24 1545    Specimen: Blood Updated: 03/05/24 1610     HCG Qualitative Negative    CBC Auto Differential [[id number]]   (Abnormal) Collected: 03/05/24 1545    Specimen: Blood Updated: 03/05/24 1642     WBC 9.56 10*3/mm3      RBC 4.00 10*6/mm3      Hemoglobin 12.5 g/dL      Hematocrit 37.4 %      MCV 93.5 fL      MCH 31.3 pg      MCHC 33.4 g/dL      RDW 12.7 %      RDW-SD 43.4 fl      MPV 9.8 fL      Platelets 388 10*3/mm3      Neutrophil % 76.3 %      Lymphocyte % 14.9 %      Monocyte % 5.1 %      Eosinophil % 2.5 %      Basophil % 0.5 %      Immature Grans % 0.7 %      Neutrophils, Absolute 7.29 10*3/mm3      Lymphocytes, Absolute 1.42 10*3/mm3      Monocytes, Absolute 0.49 10*3/mm3      Eosinophils, Absolute 0.24 10*3/mm3      Basophils, Absolute 0.05 10*3/mm3      Immature Grans, Absolute 0.07 10*3/mm3      nRBC 0.0 /100 WBC     Urinalysis With Microscopic If Indicated (No Culture) - Urine, Clean Catch [[id number]]  (Abnormal) Collected: 03/05/24 1626    Specimen: Urine, Clean Catch Updated: 03/05/24 1659     Color, UA Yellow     Appearance, UA Clear     pH, UA 8.5     Specific Gravity, UA 1.007     Glucose, UA Negative     Ketones, UA Negative     Bilirubin, UA Negative     Blood, UA Negative     Protein, UA Negative     Leuk Esterase, UA Small (1+)     Nitrite, UA Negative     Urobilinogen, UA 0.2 E.U./dL    Urinalysis, Microscopic Only - Urine, Clean Catch [[id number]]  (Abnormal) Collected: 03/05/24 1626    Specimen: Urine, Clean Catch Updated: 03/05/24 1731     RBC, UA 0-2 /HPF      WBC, UA 3-5 /HPF      Bacteria, UA None Seen /HPF      Squamous Epithelial Cells, UA 3-6 /HPF      Hyaline Casts, UA 0-2 /LPF      Methodology Manual Light Microscopy            Imaging Results (Last 24 Hours)       Procedure Component Value Units Date/Time    CT Abdomen Pelvis Without Contrast [[id number]] Collected: 03/05/24 1828     Updated: 03/05/24 1841    Narrative:      CT ABDOMEN AND PELVIS WITHOUT IV CONTRAST     HISTORY: Left-sided abdominal and back pain     TECHNIQUE: Radiation dose reduction techniques were utilized, including  automated  exposure control and exposure modulation based on body size.   3 mm images were obtained through the abdomen and pelvis without IV  contrast.     COMPARISON: None     FINDINGS:  Patchy groundglass and pulmonary opacification is present throughout the  visualized lung bases, some areas of which have a nodular appearance.  There are no findings of small bowel obstruction. The appendix is  unremarkable. The liver, gallbladder, pancreas, spleen and adrenal  glands have an unremarkable noncontrast CT appearance. Hypodense right  renal lesion demonstrating density less than 15 Hounsfield images likely  benign per Bosniak 2019 criteria. No hydronephrosis. The bladder is  underdistended and not well evaluated. There is an intrauterine device.     No abdominal pelvic adenopathy by size criteria. There is colonic  diverticulosis. Short segment of wall thickening with pericolonic fat  stranding involves the sigmoid colon. No definite free intraperitoneal  air is seen. No suspicious lytic or blastic osseous lesions.       Impression:      1.  Findings of acute diverticulitis involving the sigmoid colon. Please  note evaluation is suboptimal intravenous contrast. No definite free  intraperitoneal air is seen.  2.  Findings most concerning for multifocal pneumonia within the  bilateral lung bases, incompletely visualized. Asymmetric edema is less  likely. Correlation with patient history is recommended with at least  follow-up with chest CT in 6 to 8 weeks to ensure resolution given the  somewhat nodular appearance of multiple areas.  3.  Other findings as above.           This report was finalized on 3/5/2024 6:38 PM by Dr. Prosper Richardson M.D  on Workstation: [id number]                   No orders to display        Assessment/Plan     Active Hospital Problems    Diagnosis  POA    **LLQ abdominal pain [R10.32]  Yes    Diverticulitis [K57.92]  Yes    Depression [F32.A]  Unknown      Resolved Hospital Problems   No resolved problems  to display.       Ms. Chisholm is a 43 y.o. female with history of prior bouts with diverticulitis and several drug allergies presenting with continued left lower quadrant pain despite treatment with doxycycline for presumed acute diverticulitis    Difficult case in regards to antibiotic choice.  Doxycycline not optimal choice generally but it does seem to have improved her lab work, specifically dropping her white count down to normal.  Despite this her symptoms have not improved.  Discussed with Dr. Heaton who has already seen the patient as well.  He recommends Azactam and metronidazole.  He talked with patient and he felt the rash noted previously with metronidazole was difficult to definitively prove to be related to it so he recommends trial of therapy with close monitoring.  If she tolerates it likely this would be a good choice for her at discharge.  I think a lot of her symptoms may be related to constipation.  Would recommend gentle MiraLAX.  Also discussed with her that she needs colonoscopy and if she really has had this many recurrent bouts with diverticulitis could consider elective partial colectomy on the outpatient setting depending on progress.      VTE Prophylaxis - SCDs.  Code Status - Full code.       Israel Balbuena MD  [facility]  03/05/24  22:21 EST

## 2024-03-06 NOTE — PROGRESS NOTES
Discharge Planning Assessment  [facility]     Patient Name: Sushant Lala  MRN: [id number]  Today's Date: 3/6/2024    Admit Date: 3/5/2024    Plan: Home with family   Discharge Needs Assessment       Row Name 03/06/24 1515       Living Environment    People in Home spouse    Current Living Arrangements home    Potentially Unsafe Housing Conditions none    Primary Care Provided by self    Provides Primary Care For child(marquita)    Family Caregiver if Needed spouse;parent(s)    Quality of Family Relationships helpful;involved;supportive    Able to Return to Prior Arrangements yes       Resource/Environmental Concerns    Resource/Environmental Concerns none       Transition Planning    Patient/Family Anticipates Transition to home with family    Patient/Family Anticipated Services at Transition none    Transportation Anticipated family or friend will provide       Discharge Needs Assessment    Readmission Within the Last 30 Days no previous admission in last 30 days    Equipment Currently Used at Home none    Concerns to be Addressed denies needs/concerns at this time    Anticipated Changes Related to Illness none    Equipment Needed After Discharge none    Provided Post Acute Provider List? N/A    Provided Post Acute Provider Quality & Resource List? N/A                   Discharge Plan       Row Name 03/06/24 1518       Plan    Plan Home with family    Plan Comments Facesheet and dc plan verified. Patient lives with her spouse and minor daughter. She is IADL, no DME other than walking boot. PCP is Dr. Bourgeois. Preferred pharmacy is [facility] on [address] or [facility]. Patient is planning to return home with the help of her family. Patient denied dc needs. Spouse or mother will provide dc transportation.                  Continued Care and Services - Admitted Since 3/5/2024    No active coordination exists for this encounter.       Expected Discharge Date and Time       Expected Discharge Date Expected  Discharge Time    Mar 8, 2024            Demographic Summary    No documentation.                  Functional Status       Row Name 03/06/24 1517       Functional Status    Usual Activity Tolerance good    Current Activity Tolerance good       Assessment of Health Literacy    Health Literacy Good       Functional Status, IADL    Medications independent    Meal Preparation independent    Housekeeping independent    Laundry independent    Shopping independent                   Psychosocial    No documentation.                  Abuse/Neglect    No documentation.                  Legal    No documentation.                  Substance Abuse    No documentation.                  Patient Forms    No documentation.                     Kat Koroma RN

## 2024-03-06 NOTE — ED NOTES
Nursing report ED to floor  Sushant Chisholm  43 y.o.  female    HPI :  HPI (Adult)  Stated Reason for Visit: L sided back and L leg pain    Chief Complaint  Chief Complaint   Patient presents with    Back Pain    Leg Pain              Admitting doctor:   Israel Balbuena MD    Admitting diagnosis:   The primary encounter diagnosis was Diverticulitis. Diagnoses of Lower abdominal pain and Acute left-sided low back pain, unspecified whether sciatica present were also pertinent to this visit.    Code status:   Current Code Status       Date Active Code Status Order ID Comments User Context       Prior          Sushant Chisholm is a 43 y.o. female with a medical history of diverticulitis who presents to the ED c/o acute left-sided back and left lower quadrant abdominal pain that radiates into her anterior left leg.  She states was present upon waking this morning.  She states she was admitted to [facility] over the weekend for diverticulitis.  She is still currently on antibiotics.  She has a history of chronic back pain but does not see anyone regularly for this.  States this does not feel like her typical back pain symptoms.  She had hematuria a few days ago but has not noted it since.  Denies any vomiting diarrhea dysuria urinary frequency saddle paresthesia lower extremity weakness or bowel or bladder dysfunction.  She denies any back injury.     Allergies:   Penicillins, Levaquin [levofloxacin], and Metronidazole    Isolation:   No active isolations    Intake and Output  No intake or output data in the 24 hours ending 03/05/24 1959    Weight:   There were no vitals filed for this visit.    Most recent vitals:   Vitals:    03/05/24 1831 03/05/24 1901 03/05/24 1931 03/05/24 1950   BP: 122/84 164/94 130/97    Pulse: 83 74 73 71   Resp:       Temp:       SpO2: 93% 94% 91% 91%       Active LDAs/IV Access:   Lines, Drains & Airways       Active LDAs       Name Placement date Placement time Site Days     Peripheral IV 03/05/24 1544 Left Antecubital 03/05/24  1544  Antecubital  less than 1                    Labs (abnormal labs have a star):   Labs Reviewed   COMPREHENSIVE METABOLIC PANEL - Abnormal; Notable for the following components:       Result Value    Glucose 113 (*)     BUN 5 (*)     BUN/Creatinine Ratio 6.6 (*)     All other components within normal limits    Narrative:     GFR Normal >60  Chronic Kidney Disease <60  Kidney Failure <15     URINALYSIS W/ MICROSCOPIC IF INDICATED (NO CULTURE) - Abnormal; Notable for the following components:    pH, UA 8.5 (*)     Leuk Esterase, UA Small (1+) (*)     All other components within normal limits   CBC WITH AUTO DIFFERENTIAL - Abnormal; Notable for the following components:    Neutrophil % 76.3 (*)     Lymphocyte % 14.9 (*)     Immature Grans % 0.7 (*)     Neutrophils, Absolute 7.29 (*)     Immature Grans, Absolute 0.07 (*)     All other components within normal limits   URINALYSIS, MICROSCOPIC ONLY - Abnormal; Notable for the following components:    WBC, UA 3-5 (*)     Squamous Epithelial Cells, UA 3-6 (*)     All other components within normal limits   LIPASE - Normal   HCG, SERUM, QUALITATIVE - Normal   CBC AND DIFFERENTIAL    Narrative:     The following orders were created for panel order CBC & Differential.  Procedure                               Abnormality         Status                     ---------                               -----------         ------                     CBC Auto Differential[[id number]]        Abnormal            Final result                 Please view results for these tests on the individual orders.       EKG:   No orders to display       Meds given in ED:   Medications   morphine injection 4 mg (4 mg Intravenous Given 3/5/24 1548)   ondansetron (ZOFRAN) injection 4 mg (4 mg Intravenous Given 3/5/24 1548)   morphine injection 4 mg (4 mg Intravenous Given 3/5/24 1782)       Imaging results:  CT Abdomen Pelvis Without  Contrast    Result Date: 3/5/2024  1.  Findings of acute diverticulitis involving the sigmoid colon. Please note evaluation is suboptimal intravenous contrast. No definite free intraperitoneal air is seen. 2.  Findings most concerning for multifocal pneumonia within the bilateral lung bases, incompletely visualized. Asymmetric edema is less likely. Correlation with patient history is recommended with at least follow-up with chest CT in 6 to 8 weeks to ensure resolution given the somewhat nodular appearance of multiple areas. 3.  Other findings as above.    This report was finalized on 3/5/2024 6:38 PM by Dr. Prosper Richardson M.D on Workstation: [id number]       Ambulatory status:   - Ax1    Social issues:   Social History     Socioeconomic History    Marital status:    Tobacco Use    Smoking status: Every Day     Current packs/day: 0.25     Average packs/day: 0.3 packs/day for 28.0 years (7.0 ttl pk-yrs)     Types: Cigarettes    Smokeless tobacco: Never    Tobacco comments:     discussed working to quit from smoking   Vaping Use    Vaping status: Never Used   Substance and Sexual Activity    Alcohol use: Yes     Comment: seldom    Drug use: Yes     Types: Marijuana    Sexual activity: Yes     Partners: Male     Birth control/protection: I.U.D.     Comment: vasectomy       Peripheral Neurovascular  Peripheral Neurovascular (Adult)  Peripheral Neurovascular WDL: WDL    Neuro Cognitive  Neuro Cognitive (Adult)  Cognitive/Neuro/Behavioral WDL: WDL    Learning  Learning Assessment (Adult)  Learning Readiness and Ability: no barriers identified    Respiratory  Respiratory WDL  Respiratory WDL: WDL    Abdominal Pain       Pain Assessments  Pain (Adult)  (0-10) Pain Rating: Rest: 9  (0-10) Pain Rating: Activity: 9  Pain Location: abdomen  Pain Side/Orientation: lower  Pain Management Interventions: see MAR  Response to Pain Interventions: intervention not effective per patient    NIH Stroke Scale       Allyson Vick  RN  03/05/24 19:59 EST     Call [facility] #[phone] with any questions

## 2024-03-06 NOTE — PROGRESS NOTES
Name: Sushant Lala ADMIT: 3/5/2024   : 1980  PCP: Alissa Bourgeois MD    MRN: [id number] LOS: 0 days   AGE/SEX: 43 y.o. female  ROOM: [facility]   Subjective   Chief Complaint   Patient presents with    Back Pain    Leg Pain            Ms. Chisholm is a 43 y.o. female with history of prior bouts with diverticulitis and several drug allergies presenting with continued left lower quadrant pain despite treatment with doxycycline for presumed acute diverticulitis     Pain is fair  Some nauea  On liquid diet  On abx    States has had many episoides of diverticulitis in the past    ROS  No f/c  No n/v  No cp/palp  No soa/cough    Objective   Vital Signs  Temp:  [97.3 °F (36.3 °C)-98.4 °F (36.9 °C)] 97.3 °F (36.3 °C)  Heart Rate:  [] 102  Resp:  [18] 18  BP: (122-164)/(70-99) 134/85  SpO2:  [91 %-98 %] 95 %  on  Flow (L/min):  [2] 2;   Device (Oxygen Therapy): room air  Body mass index is 30.98 kg/m².    Physical Exam  Constitutional:       General: She is not in acute distress.  HENT:      Head: Normocephalic and atraumatic.   Eyes:      General: No scleral icterus.  Cardiovascular:      Rate and Rhythm: Regular rhythm.      Heart sounds: Normal heart sounds.   Pulmonary:      Effort: Pulmonary effort is normal. No respiratory distress.   Abdominal:      General: There is no distension.      Palpations: Abdomen is soft.      Tenderness: There is abdominal tenderness (mild generalized). There is no guarding.   Musculoskeletal:      Cervical back: Neck supple.   Neurological:      Mental Status: She is alert.   Psychiatric:         Behavior: Behavior normal.         Results Review:       I reviewed the patient's new clinical results.  Results from last 7 days   Lab Units 24  0535 24  1545 24  1018   WBC 10*3/mm3 7.80 9.56 15.75*   HEMOGLOBIN g/dL 12.0 12.5 13.7   PLATELETS 10*3/mm3 357 388 380     Results from last 7 days   Lab Units 24  0535 24  1545   SODIUM mmol/L 140 138  "  POTASSIUM mmol/L 3.6 3.9   CHLORIDE mmol/L 108* 103   CO2 mmol/L 24.0 25.0   BUN mg/dL 4* 5*   CREATININE mg/dL 0.71 0.76   GLUCOSE mg/dL 90 113*   Estimated Creatinine Clearance: 98.1 mL/min (by C-G formula based on SCr of 0.71 mg/dL).  Results from last 7 days   Lab Units 03/05/24  1545   ALBUMIN g/dL 3.8   BILIRUBIN mg/dL 0.3   ALK PHOS U/L 94   AST (SGOT) U/L 13   ALT (SGPT) U/L 20     Results from last 7 days   Lab Units 03/06/24  0535 03/05/24  1545 03/02/24  1018   CALCIUM mg/dL 8.5* 9.4  --    ALBUMIN g/dL  --  3.8  --    MAGNESIUM mg/dL  --   --  1.8         Coag     HbA1C   Lab Results   Component Value Date    HGBA1C 5.9 (H) 09/21/2023    HGBA1C 5.4 11/14/2018     Infection     Radiology(recent) CT Abdomen Pelvis Without Contrast    Result Date: 3/5/2024  1.  Findings of acute diverticulitis involving the sigmoid colon. Please note evaluation is suboptimal intravenous contrast. No definite free intraperitoneal air is seen. 2.  Findings most concerning for multifocal pneumonia within the bilateral lung bases, incompletely visualized. Asymmetric edema is less likely. Correlation with patient history is recommended with at least follow-up with chest CT in 6 to 8 weeks to ensure resolution given the somewhat nodular appearance of multiple areas. 3.  Other findings as above.    This report was finalized on 3/5/2024 6:38 PM by Dr. Prosper Richardson M.D on Workstation: [id number]     No results found for: \"TROPONINT\", \"TROPONINI\", \"BNP\"  No components found for: \"TSH;2\"    aztreonam, 2,000 mg, Intravenous, Q8H  metroNIDAZOLE, 500 mg, Oral, Q8H  pantoprazole, 40 mg, Oral, Q AM  polyethylene glycol, 17 g, Oral, Daily  sertraline, 50 mg, Oral, Daily  sodium chloride, 10 mL, Intravenous, Q12H  vancomycin, 1,250 mg, Intravenous, Q12H      Pharmacy to dose vancomycin,   sodium chloride, 100 mL/hr, Last Rate: 100 mL/hr (03/06/24 0194)    Diet: Liquid; Clear Liquid; Fluid Consistency: Thin (IDDSI 0)      Assessment & Plan "      Active Hospital Problems    Diagnosis  POA    **LLQ abdominal pain [R10.32]  Yes    Dehydration [E86.0]  Unknown    Slow transit constipation [K59.01]  Unknown    Diverticulitis [K57.92]  Yes    Depression [F32.A]  Yes      Resolved Hospital Problems   No resolved problems to display.       Ms. Chisholm is a 43 y.o. female with history of prior bouts with diverticulitis and several drug allergies presenting with continued left lower quadrant pain despite treatment with doxycycline for presumed acute diverticulitis     ABX as directed by ID with allergies  On agents for element of constipation   Multiple episodes of diverticulitis in the past. Will have surgery evaluation.      VTE Prophylaxis - SCDs.      LANDON Brandt MD  [facility]  03/06/24  10:09 EST

## 2024-03-07 PROCEDURE — 96372 THER/PROPH/DIAG INJ SC/IM: CPT

## 2024-03-07 PROCEDURE — 25810000003 SODIUM CHLORIDE 0.9 % SOLUTION: Performed by: HOSPITALIST

## 2024-03-07 PROCEDURE — 99221 1ST HOSP IP/OBS SF/LOW 40: CPT

## 2024-03-07 PROCEDURE — 25010000002 AZTREONAM PER 500 MG: Performed by: HOSPITALIST

## 2024-03-07 PROCEDURE — 25010000002 VANCOMYCIN 10 G RECONSTITUTED SOLUTION: Performed by: INTERNAL MEDICINE

## 2024-03-07 PROCEDURE — 96366 THER/PROPH/DIAG IV INF ADDON: CPT

## 2024-03-07 PROCEDURE — 25810000003 SODIUM CHLORIDE 0.9 % SOLUTION: Performed by: INTERNAL MEDICINE

## 2024-03-07 PROCEDURE — 25010000002 ENOXAPARIN PER 10 MG: Performed by: INTERNAL MEDICINE

## 2024-03-07 PROCEDURE — G0378 HOSPITAL OBSERVATION PER HR: HCPCS

## 2024-03-07 PROCEDURE — 25010000002 METRONIDAZOLE 500 MG/100ML SOLUTION: Performed by: SURGERY

## 2024-03-07 RX ORDER — ENOXAPARIN SODIUM 100 MG/ML
40 INJECTION SUBCUTANEOUS EVERY 24 HOURS
Status: DISCONTINUED | OUTPATIENT
Start: 2024-03-07 | End: 2024-03-09 | Stop reason: HOSPADM

## 2024-03-07 RX ORDER — HYDROCORTISONE 25 MG/G
CREAM TOPICAL 2 TIMES DAILY
Status: DISCONTINUED | OUTPATIENT
Start: 2024-03-07 | End: 2024-03-09 | Stop reason: HOSPADM

## 2024-03-07 RX ADMIN — SERTRALINE 50 MG: 50 TABLET, FILM COATED ORAL at 08:12

## 2024-03-07 RX ADMIN — HYDROCORTISONE: 25 CREAM TOPICAL at 20:00

## 2024-03-07 RX ADMIN — AZTREONAM 2000 MG: 2 INJECTION, POWDER, LYOPHILIZED, FOR SOLUTION INTRAMUSCULAR; INTRAVENOUS at 09:46

## 2024-03-07 RX ADMIN — VANCOMYCIN HYDROCHLORIDE 1250 MG: 10 INJECTION, POWDER, LYOPHILIZED, FOR SOLUTION INTRAVENOUS at 08:11

## 2024-03-07 RX ADMIN — Medication 10 ML: at 20:01

## 2024-03-07 RX ADMIN — SODIUM CHLORIDE 100 ML/HR: 9 INJECTION, SOLUTION INTRAVENOUS at 01:07

## 2024-03-07 RX ADMIN — METRONIDAZOLE 500 MG: 500 INJECTION, SOLUTION INTRAVENOUS at 03:02

## 2024-03-07 RX ADMIN — METRONIDAZOLE 500 MG: 500 INJECTION, SOLUTION INTRAVENOUS at 12:21

## 2024-03-07 RX ADMIN — METRONIDAZOLE 500 MG: 500 INJECTION, SOLUTION INTRAVENOUS at 19:56

## 2024-03-07 RX ADMIN — AZTREONAM 2000 MG: 2 INJECTION, POWDER, LYOPHILIZED, FOR SOLUTION INTRAMUSCULAR; INTRAVENOUS at 18:01

## 2024-03-07 RX ADMIN — PANTOPRAZOLE SODIUM 40 MG: 40 TABLET, DELAYED RELEASE ORAL at 06:45

## 2024-03-07 RX ADMIN — ENOXAPARIN SODIUM 40 MG: 100 INJECTION SUBCUTANEOUS at 20:01

## 2024-03-07 RX ADMIN — VANCOMYCIN HYDROCHLORIDE 1250 MG: 10 INJECTION, POWDER, LYOPHILIZED, FOR SOLUTION INTRAVENOUS at 20:51

## 2024-03-07 RX ADMIN — Medication 10 ML: at 08:11

## 2024-03-07 NOTE — PLAN OF CARE
Goal Outcome Evaluation:  Plan of Care Reviewed With: patient, family        Progress: improving  Outcome Evaluation: Patient is alert and oriented x4, VSS on room air, NSR on telemetry. Patient is up ad pallavi and has taken multiple walks during the day. Patient has had some complaints fo pain throughout the shift- PRN medication given. Patient has had diet advanced to full liquid diet. Call light within reach and bed in lowest posiiton.

## 2024-03-07 NOTE — PROGRESS NOTES
Name: Sushant Lala ADMIT: 3/5/2024   : 1980  PCP: Alissa Bourgeois MD    MRN: [id number] LOS: 0 days   AGE/SEX: 43 y.o. female  ROOM: [facility]   Subjective   Chief Complaint   Patient presents with    Back Pain    Leg Pain            Ms. Chisholm is a 43 y.o. female with history of prior bouts with diverticulitis and several drug allergies presenting with continued left lower quadrant pain despite treatment with doxycycline for presumed acute diverticulitis     Pain is better  On liquid diet  On abx    States has had many episoides of diverticulitis in the past    ROS  No f/c  No n/v  No cp/palp  No soa/cough    Objective   Vital Signs  Temp:  [97.3 °F (36.3 °C)-98.4 °F (36.9 °C)] 97.3 °F (36.3 °C)  Heart Rate:  [] 73  Resp:  [16-18] 16  BP: (120-135)/(67-85) 135/72  SpO2:  [92 %-100 %] 100 %  on   ;   Device (Oxygen Therapy): room air  Body mass index is 31.28 kg/m².    Physical Exam  Constitutional:       General: She is not in acute distress.  HENT:      Head: Normocephalic and atraumatic.   Eyes:      General: No scleral icterus.  Cardiovascular:      Rate and Rhythm: Regular rhythm.      Heart sounds: Normal heart sounds.   Pulmonary:      Effort: Pulmonary effort is normal. No respiratory distress.   Abdominal:      General: There is no distension.      Palpations: Abdomen is soft.      Tenderness: There is abdominal tenderness (mild generalized). There is no guarding.   Musculoskeletal:      Cervical back: Neck supple.   Neurological:      Mental Status: She is alert.   Psychiatric:         Behavior: Behavior normal.         Results Review:       I reviewed the patient's new clinical results.  Results from last 7 days   Lab Units 24  0535 24  1545 24  1018   WBC 10*3/mm3 7.80 9.56 15.75*   HEMOGLOBIN g/dL 12.0 12.5 13.7   PLATELETS 10*3/mm3 357 388 380     Results from last 7 days   Lab Units 24  0535 24  1545   SODIUM mmol/L 140 138   POTASSIUM mmol/L 3.6 3.9  "  CHLORIDE mmol/L 108* 103   CO2 mmol/L 24.0 25.0   BUN mg/dL 4* 5*   CREATININE mg/dL 0.71 0.76   GLUCOSE mg/dL 90 113*   Estimated Creatinine Clearance: 98.5 mL/min (by C-G formula based on SCr of 0.71 mg/dL).  Results from last 7 days   Lab Units 03/05/24  1545   ALBUMIN g/dL 3.8   BILIRUBIN mg/dL 0.3   ALK PHOS U/L 94   AST (SGOT) U/L 13   ALT (SGPT) U/L 20     Results from last 7 days   Lab Units 03/06/24  0535 03/05/24  1545 03/02/24  1018   CALCIUM mg/dL 8.5* 9.4  --    ALBUMIN g/dL  --  3.8  --    MAGNESIUM mg/dL  --   --  1.8         Coag     HbA1C   Lab Results   Component Value Date    HGBA1C 5.9 (H) 09/21/2023    HGBA1C 5.4 11/14/2018     Infection     Radiology(recent) CT Abdomen Pelvis Without Contrast    Result Date: 3/5/2024  1.  Findings of acute diverticulitis involving the sigmoid colon. Please note evaluation is suboptimal intravenous contrast. No definite free intraperitoneal air is seen. 2.  Findings most concerning for multifocal pneumonia within the bilateral lung bases, incompletely visualized. Asymmetric edema is less likely. Correlation with patient history is recommended with at least follow-up with chest CT in 6 to 8 weeks to ensure resolution given the somewhat nodular appearance of multiple areas. 3.  Other findings as above.    This report was finalized on 3/5/2024 6:38 PM by Dr. Prosper Richardson M.D on Workstation: [id number]     No results found for: \"TROPONINT\", \"TROPONINI\", \"BNP\"  No components found for: \"TSH;2\"    aztreonam, 2,000 mg, Intravenous, Q8H  metroNIDAZOLE, 500 mg, Intravenous, Q8H  pantoprazole, 40 mg, Oral, Q AM  polyethylene glycol, 17 g, Oral, Daily  sertraline, 50 mg, Oral, Daily  sodium chloride, 10 mL, Intravenous, Q12H  vancomycin, 1,250 mg, Intravenous, Q12H      Pharmacy to dose vancomycin,     Diet: Liquid; Full Liquid; Fluid Consistency: Thin (IDDSI 0)      Assessment & Plan      Active Hospital Problems    Diagnosis  POA    **LLQ abdominal pain [R10.32]  Yes "    Dehydration [E86.0]  Unknown    Slow transit constipation [K59.01]  Unknown    Diverticulitis [K57.92]  Yes    Depression [F32.A]  Yes      Resolved Hospital Problems   No resolved problems to display.       Ms. Chisholm is a 43 y.o. female with history of prior bouts with diverticulitis and several drug allergies presenting with continued left lower quadrant pain despite treatment with doxycycline for presumed acute diverticulitis     ABX as directed by ID with allergies  On agents for element of constipation   Multiple episodes of diverticulitis in the past. Will have surgery evaluation.      VTE Prophylaxis - SCDs. Add lovenox    Will advance diet to full liquid, stop IVFs. She is improving may be able to be discharged tomorrow with Surgery followup as outpatient       LANDON Brandt MD  [facility]  03/07/24  10:09 EST

## 2024-03-07 NOTE — CONSULTS
General Surgery     Summary:     Sushant Lala is a 43 y.o. year old with recurrent acute diverticulitis. CT shows diverticulitis without abscess or perforation.   Patient tolerating clear liquid diet.  Pain controlled with medication, tender to left lower quadrant.  Patient is afebrile, vitals and labs are good.  Continue IV antibiotics per ID, okay to advance to full liquid diet, will continue with conservative management at this time.     Chief Complaint:    LLQ abdominal pain    History of Present Illness:     Sushant Lala is a 43 y.o. year old started having symptoms of sharp pain in LLQ on Friday with associated N/V, which continued to get worse so she went to [place] on Saturday. She was admitted for 1 day there and discharged on doxy and Bentyl. Patient started having increased abdominal pain on the 5th and decided to come to [facility] ER.  Denies fever or chills.  Denies nausea or vomiting.  Positive for diarrhea.    Patient reports this is her 3rd episode of diverticulitis in the past year. Very first episode of diverticulitis was back in 2012 and she has had an episode almost every year since then. This is about the third time she has been hospitalized for diverticulitis in the past 12 years. Last colonoscopy was about 10 years ago, she does not remember results, she thinks it was done with Varun Moore. Reports no history of abdominal surgeries. Uterine mass removal at 13yo was vaginally.     Past Medical History:   Past Medical History:   Diagnosis Date    Advanced maternal age in multigravida     Allergic rhinitis     Anxiety     Diverticulitis     Drug abuse, marijuana     quit after found out pregnant    Uterine mass     13 yo        Past Surgical History:    Past Surgical History:   Procedure Laterality Date    ADENOIDECTOMY      MYRINGOTOMY W/ TUBES      TONSILLECTOMY         Family History:    Family History   Problem Relation Age of Onset    Diabetes Father     Breast cancer Mother      Hypertension Mother     Diabetes Mother     Kidney failure Paternal Grandmother     Breast cancer Maternal Grandmother     Colon cancer Neg Hx      Uncle and aunt with colon cancer  Siblings all with diverticulitis  Aunt with Crohns disease    Social History:    Social History     Socioeconomic History    Marital status:    Tobacco Use    Smoking status: Every Day     Current packs/day: 0.25     Average packs/day: 0.3 packs/day for 28.0 years (7.0 ttl pk-yrs)     Types: Cigarettes    Smokeless tobacco: Never    Tobacco comments:     discussed working to quit from smoking   Vaping Use    Vaping status: Never Used   Substance and Sexual Activity    Alcohol use: Yes     Alcohol/week: 1.0 standard drink of alcohol     Types: 1 Cans of beer per week    Drug use: Yes     Types: Marijuana    Sexual activity: Yes     Partners: Male     Birth control/protection: I.U.D.     Comment: vasectomy     Denies tobacco use  Occasional alcohol use      Allergies:   Allergies   Allergen Reactions    Penicillins Anaphylaxis    Levaquin [Levofloxacin] Rash    Metronidazole Rash     Tolerated during admission 3/5/2024       Medications:   Home medications include  Zyrtec 5 mg  Amantadine 100 mg  Diclofenac 75 mg  Zofran  Zoloft 50 mg    Radiology/Endoscopy:    IMPRESSION:  1.  Findings of acute diverticulitis involving the sigmoid colon. Please  note evaluation is suboptimal intravenous contrast. No definite free  intraperitoneal air is seen.  2.  Findings most concerning for multifocal pneumonia within the  bilateral lung bases, incompletely visualized. Asymmetric edema is less  likely. Correlation with patient history is recommended with at least  follow-up with chest CT in 6 to 8 weeks to ensure resolution given the  somewhat nodular appearance of multiple areas.  3.  Other findings as above.        This report was finalized on 3/5/2024 6:38 PM by Dr. Prosper Richardson M.D  on Workstation: [id number]      Labs:    Results from last 7  days   Lab Units 03/06/24  0535 03/05/24  1545 03/05/24  1545 03/02/24  1018   WBC 10*3/mm3 7.80   < > 9.56 15.75*   HEMOGLOBIN g/dL 12.0   < > 12.5 13.7   HEMATOCRIT % 36.0   < > 37.4 40.7   PLATELETS 10*3/mm3 357  --  388 380    < > = values in this interval not displayed.     Results from last 7 days   Lab Units 03/06/24  0535 03/05/24  1545   SODIUM mmol/L 140 138   POTASSIUM mmol/L 3.6 3.9   CHLORIDE mmol/L 108* 103   CO2 mmol/L 24.0 25.0   BUN mg/dL 4* 5*   CREATININE mg/dL 0.71 0.76   CALCIUM mg/dL 8.5* 9.4   BILIRUBIN mg/dL  --  0.3   ALK PHOS U/L  --  94   ALT (SGPT) U/L  --  20   AST (SGOT) U/L  --  13   GLUCOSE mg/dL 90 113*     Vitals  Temp 97.3  HR 73  RR 16  /72  SpO2 100    Review of Systems   Constitutional:  Negative for appetite change, chills and fever.   HENT:  Negative for swollen glands and trouble swallowing.    Eyes:  Negative for visual disturbance.   Respiratory:  Negative for chest tightness and shortness of breath.    Cardiovascular:  Negative for chest pain and palpitations.   Gastrointestinal:  Positive for abdominal pain and diarrhea. Negative for abdominal distention, blood in stool, nausea and vomiting.   Genitourinary:  Negative for dysuria and hematuria.   Musculoskeletal:  Positive for back pain. Negative for myalgias.   Skin:  Negative for color change, rash and wound.   Neurological:  Negative for dizziness, light-headedness and confusion.        Physical Exam  Constitutional:       General: She is not in acute distress.     Appearance: Normal appearance. She is not ill-appearing.   HENT:      Head: Normocephalic and atraumatic.   Eyes:      Extraocular Movements: Extraocular movements intact.      Pupils: Pupils are equal, round, and reactive to light.   Cardiovascular:      Rate and Rhythm: Normal rate.      Pulses: Normal pulses.      Heart sounds: Normal heart sounds.   Pulmonary:      Effort: Pulmonary effort is normal.      Breath sounds: Normal breath sounds.    Abdominal:      General: Bowel sounds are normal. There is no distension.      Palpations: Abdomen is soft. There is no mass.      Tenderness: There is abdominal tenderness. There is no guarding or rebound.      Hernia: No hernia is present.   Skin:     General: Skin is warm and dry.   Neurological:      General: No focal deficit present.      Mental Status: She is alert and oriented to person, place, and time.   Psychiatric:         Mood and Affect: Mood normal.         Behavior: Behavior normal.                JASWINDER Martinez   General and Endoscopic Surgery  [facility]    [address] [place] [address], [address]  [place], [place], [address]  P: [phone]  F: [phone]

## 2024-03-07 NOTE — PLAN OF CARE
Problem: Adult Inpatient Plan of Care  Goal: Plan of Care Review  Outcome: Ongoing, Progressing  Flowsheets (Taken 3/7/2024 0558)  Progress: improving  Plan of Care Reviewed With: patient  Outcome Evaluation: AOx4, VSS, SR on telemetry monitor, room air, w/ ongoing IV fluid infusion, up ad pallavi, c/o pain -PRN pain medication given, had bowel movement, rested comfortably throughout the night, call light within reach.  Goal: Patient-Specific Goal (Individualized)  Outcome: Ongoing, Progressing  Goal: Absence of Hospital-Acquired Illness or Injury  Outcome: Ongoing, Progressing  Intervention: Identify and Manage Fall Risk  Recent Flowsheet Documentation  Taken 3/7/2024 0400 by Julio Vasquez, RN  Safety Promotion/Fall Prevention:   safety round/check completed   room organization consistent   nonskid shoes/slippers when out of bed   lighting adjusted   clutter free environment maintained   assistive device/personal items within reach  Taken 3/7/2024 0200 by Julio Vasquez, RN  Safety Promotion/Fall Prevention:   safety round/check completed   room organization consistent   nonskid shoes/slippers when out of bed   lighting adjusted   clutter free environment maintained   assistive device/personal items within reach  Taken 3/7/2024 0000 by Julio Vasquez, RN  Safety Promotion/Fall Prevention:   safety round/check completed   room organization consistent   nonskid shoes/slippers when out of bed   lighting adjusted   clutter free environment maintained   assistive device/personal items within reach  Taken 3/6/2024 2200 by Julio Vasquez, RN  Safety Promotion/Fall Prevention:   safety round/check completed   room organization consistent   nonskid shoes/slippers when out of bed   lighting adjusted   clutter free environment maintained   assistive device/personal items within reach  Taken 3/6/2024 2015 by Julio Vasquez, RN  Safety Promotion/Fall Prevention:   safety round/check  completed   room organization consistent   nonskid shoes/slippers when out of bed   lighting adjusted   clutter free environment maintained   assistive device/personal items within reach  Intervention: Prevent Skin Injury  Recent Flowsheet Documentation  Taken 3/7/2024 0400 by Julio Vasquez RN  Body Position: position changed independently  Taken 3/7/2024 0200 by Julio Vasquez RN  Body Position: position changed independently  Taken 3/7/2024 0000 by Julio Vasquez RN  Body Position: position changed independently  Taken 3/6/2024 2200 by Julio Vasquez RN  Body Position: position changed independently  Taken 3/6/2024 2015 by Julio Vasquez RN  Body Position: position changed independently  Intervention: Prevent and Manage VTE (Venous Thromboembolism) Risk  Recent Flowsheet Documentation  Taken 3/7/2024 0400 by Julio Vasquez RN  Activity Management: up ad pallavi  Taken 3/7/2024 0200 by Julio Vasquez RN  Activity Management: up ad pallavi  Taken 3/7/2024 0000 by Julio Vasquez RN  Activity Management: up ad pallavi  Taken 3/6/2024 2200 by Julio Vasquez RN  Activity Management: up ad pallavi  Taken 3/6/2024 2015 by Julio Vasquez RN  Activity Management: up ad pallavi  Range of Motion: active ROM (range of motion) encouraged  Goal: Optimal Comfort and Wellbeing  Outcome: Ongoing, Progressing  Intervention: Monitor Pain and Promote Comfort  Recent Flowsheet Documentation  Taken 3/6/2024 2015 by Julio Vasquez RN  Pain Management Interventions:   see MAR   unnecessary movement minimized   relaxation techniques promoted   position adjusted   pillow support provided  Intervention: Provide Person-Centered Care  Recent Flowsheet Documentation  Taken 3/6/2024 2015 by Julio Vasquez RN  Trust Relationship/Rapport:   care explained   choices provided   questions answered   questions encouraged  Goal: Readiness for Transition  of Care  Outcome: Ongoing, Progressing     Problem: Pain Chronic (Persistent)  Goal: Acceptable Pain Control and Functional Ability  Outcome: Ongoing, Progressing  Intervention: Develop Pain Management Plan  Recent Flowsheet Documentation  Taken 3/6/2024 2015 by Julio Vasquez, RN  Pain Management Interventions:   see MAR   unnecessary movement minimized   relaxation techniques promoted   position adjusted   pillow support provided  Intervention: Optimize Psychosocial Wellbeing  Recent Flowsheet Documentation  Taken 3/6/2024 2015 by Julio Vasquez RN  Supportive Measures: active listening utilized  Family/Support System Care:   support provided   self-care encouraged     Problem: Infection  Goal: Absence of Infection Signs and Symptoms  Outcome: Ongoing, Progressing  Goal: Absence of Infection Signs and Symptoms  Outcome: Ongoing, Progressing   Goal Outcome Evaluation:  Plan of Care Reviewed With: patient        Progress: improving  Outcome Evaluation: AOx4, VSS, SR on telemetry monitor, room air, w/ ongoing IV fluid infusion, up ad pallavi, c/o pain -PRN pain medication given, had bowel movement, rested comfortably throughout the night, call light within reach.

## 2024-03-08 PROBLEM — E86.0 DEHYDRATION: Status: RESOLVED | Noted: 2024-03-06 | Resolved: 2024-03-08

## 2024-03-08 LAB
ANION GAP SERPL CALCULATED.3IONS-SCNC: 10 MMOL/L (ref 5–15)
BUN SERPL-MCNC: 5 MG/DL (ref 6–20)
BUN/CREAT SERPL: 8.1 (ref 7–25)
CALCIUM SPEC-SCNC: 8.4 MG/DL (ref 8.6–10.5)
CHLORIDE SERPL-SCNC: 108 MMOL/L (ref 98–107)
CO2 SERPL-SCNC: 22 MMOL/L (ref 22–29)
CREAT SERPL-MCNC: 0.62 MG/DL (ref 0.57–1)
DEPRECATED RDW RBC AUTO: 42.3 FL (ref 37–54)
EGFRCR SERPLBLD CKD-EPI 2021: 113.5 ML/MIN/1.73
ERYTHROCYTE [DISTWIDTH] IN BLOOD BY AUTOMATED COUNT: 12.5 % (ref 12.3–15.4)
GLUCOSE SERPL-MCNC: 100 MG/DL (ref 65–99)
HCT VFR BLD AUTO: 36.8 % (ref 34–46.6)
HGB BLD-MCNC: 12.1 G/DL (ref 12–15.9)
MCH RBC QN AUTO: 30.3 PG (ref 26.6–33)
MCHC RBC AUTO-ENTMCNC: 32.9 G/DL (ref 31.5–35.7)
MCV RBC AUTO: 92 FL (ref 79–97)
PLATELET # BLD AUTO: 409 10*3/MM3 (ref 140–450)
PMV BLD AUTO: 9.1 FL (ref 6–12)
POTASSIUM SERPL-SCNC: 3.1 MMOL/L (ref 3.5–5.2)
RBC # BLD AUTO: 4 10*6/MM3 (ref 3.77–5.28)
SODIUM SERPL-SCNC: 140 MMOL/L (ref 136–145)
VANCOMYCIN TROUGH SERPL-MCNC: 8.3 MCG/ML (ref 5–20)
WBC NRBC COR # BLD AUTO: 7.12 10*3/MM3 (ref 3.4–10.8)

## 2024-03-08 PROCEDURE — 96372 THER/PROPH/DIAG INJ SC/IM: CPT

## 2024-03-08 PROCEDURE — 25010000002 METRONIDAZOLE 500 MG/100ML SOLUTION: Performed by: SURGERY

## 2024-03-08 PROCEDURE — 25810000003 SODIUM CHLORIDE 0.9 % SOLUTION: Performed by: INTERNAL MEDICINE

## 2024-03-08 PROCEDURE — 25010000002 VANCOMYCIN 10 G RECONSTITUTED SOLUTION: Performed by: INTERNAL MEDICINE

## 2024-03-08 PROCEDURE — 25010000002 ENOXAPARIN PER 10 MG: Performed by: INTERNAL MEDICINE

## 2024-03-08 PROCEDURE — 85027 COMPLETE CBC AUTOMATED: CPT | Performed by: INTERNAL MEDICINE

## 2024-03-08 PROCEDURE — G0378 HOSPITAL OBSERVATION PER HR: HCPCS

## 2024-03-08 PROCEDURE — 80202 ASSAY OF VANCOMYCIN: CPT | Performed by: INTERNAL MEDICINE

## 2024-03-08 PROCEDURE — 80048 BASIC METABOLIC PNL TOTAL CA: CPT | Performed by: INTERNAL MEDICINE

## 2024-03-08 PROCEDURE — 99231 SBSQ HOSP IP/OBS SF/LOW 25: CPT

## 2024-03-08 PROCEDURE — 96366 THER/PROPH/DIAG IV INF ADDON: CPT

## 2024-03-08 PROCEDURE — 25010000002 AZTREONAM PER 500 MG: Performed by: HOSPITALIST

## 2024-03-08 RX ORDER — POTASSIUM CHLORIDE 750 MG/1
40 TABLET, FILM COATED, EXTENDED RELEASE ORAL EVERY 4 HOURS
Status: DISPENSED | OUTPATIENT
Start: 2024-03-08 | End: 2024-03-09

## 2024-03-08 RX ORDER — VANCOMYCIN/0.9 % SOD CHLORIDE 1.5G/250ML
1500 PLASTIC BAG, INJECTION (ML) INTRAVENOUS EVERY 12 HOURS
Status: DISCONTINUED | OUTPATIENT
Start: 2024-03-08 | End: 2024-03-08

## 2024-03-08 RX ORDER — LEVOFLOXACIN 750 MG/1
750 TABLET, FILM COATED ORAL EVERY 24 HOURS
Status: DISCONTINUED | OUTPATIENT
Start: 2024-03-08 | End: 2024-03-09 | Stop reason: HOSPADM

## 2024-03-08 RX ORDER — POTASSIUM CHLORIDE 1.5 G/1.58G
40 POWDER, FOR SOLUTION ORAL EVERY 4 HOURS
Status: DISCONTINUED | OUTPATIENT
Start: 2024-03-08 | End: 2024-03-08

## 2024-03-08 RX ORDER — METRONIDAZOLE 500 MG/1
500 TABLET ORAL EVERY 8 HOURS SCHEDULED
Status: DISCONTINUED | OUTPATIENT
Start: 2024-03-08 | End: 2024-03-09 | Stop reason: HOSPADM

## 2024-03-08 RX ADMIN — METRONIDAZOLE 500 MG: 500 TABLET, FILM COATED ORAL at 22:26

## 2024-03-08 RX ADMIN — METRONIDAZOLE 500 MG: 500 INJECTION, SOLUTION INTRAVENOUS at 13:05

## 2024-03-08 RX ADMIN — METRONIDAZOLE 500 MG: 500 INJECTION, SOLUTION INTRAVENOUS at 02:31

## 2024-03-08 RX ADMIN — Medication 10 ML: at 09:11

## 2024-03-08 RX ADMIN — POTASSIUM CHLORIDE 40 MEQ: 750 TABLET, EXTENDED RELEASE ORAL at 20:09

## 2024-03-08 RX ADMIN — METRONIDAZOLE 500 MG: 500 TABLET, FILM COATED ORAL at 16:44

## 2024-03-08 RX ADMIN — PANTOPRAZOLE SODIUM 40 MG: 40 TABLET, DELAYED RELEASE ORAL at 06:22

## 2024-03-08 RX ADMIN — AZTREONAM 2000 MG: 2 INJECTION, POWDER, LYOPHILIZED, FOR SOLUTION INTRAMUSCULAR; INTRAVENOUS at 01:40

## 2024-03-08 RX ADMIN — VANCOMYCIN HYDROCHLORIDE 1250 MG: 10 INJECTION, POWDER, LYOPHILIZED, FOR SOLUTION INTRAVENOUS at 09:59

## 2024-03-08 RX ADMIN — SERTRALINE 50 MG: 50 TABLET, FILM COATED ORAL at 09:11

## 2024-03-08 RX ADMIN — HYDROCORTISONE: 25 CREAM TOPICAL at 09:11

## 2024-03-08 RX ADMIN — AZTREONAM 2000 MG: 2 INJECTION, POWDER, LYOPHILIZED, FOR SOLUTION INTRAMUSCULAR; INTRAVENOUS at 09:11

## 2024-03-08 RX ADMIN — LEVOFLOXACIN 750 MG: 750 TABLET, FILM COATED ORAL at 16:44

## 2024-03-08 RX ADMIN — HYDROCORTISONE: 25 CREAM TOPICAL at 22:23

## 2024-03-08 RX ADMIN — POTASSIUM CHLORIDE 40 MEQ: 1.5 POWDER, FOR SOLUTION ORAL at 16:00

## 2024-03-08 RX ADMIN — ENOXAPARIN SODIUM 40 MG: 100 INJECTION SUBCUTANEOUS at 20:09

## 2024-03-08 RX ADMIN — HYDROCODONE BITARTRATE AND ACETAMINOPHEN 1 TABLET: 5; 325 TABLET ORAL at 16:03

## 2024-03-08 RX ADMIN — Medication 10 ML: at 20:11

## 2024-03-08 NOTE — PROGRESS NOTES
Acute Care General Surgery Progress Note    Patient: Sushant Lala  YOB: 1980  MRN: [id number]      Assessment  Sushant Lala is a 43 y.o. female with a recurrent episode of acute diverticulitis, without perforation, abscess, or bleeding. Patient feeling much better this morning. Pain has subsided and controlled with medication. Pt is afebrile, vitals and labs are stable.     Plan  Okay to advance diet to GI low residue, if tolerating, okay to transition to PO antibiotics per ID   Hopefully home tomorrow with 2 week follow up with Dr. Morse - discussed with patient she will need colonoscopy in about 6-8 weeks      Subjective  No adverse events over night. Tolerating full liquid diet. Still having some diarrhea. Negative for N/V. Denies fever or chills.    Objective    Vitals:    03/08/24 0730   BP: 146/83   Pulse: 86   Resp: 16   Temp: 97.5 °F (36.4 °C)   SpO2: 92%          Physical Exam  Constitutional: alert, oriented, in no acute distress  Respiratory: Normal work of breathing, Symmetric excursion  Cardiovascular: Well pefursed, no jugular venous distention evident   Abdominal: soft, non-distended, non-tender, bowel sounds normal  Skin: warm, dry,       Laboratory Results  Results from last 7 days   Lab Units 03/08/24  0844 03/06/24  0535 03/05/24  1545 03/05/24  1545 03/02/24  1018   WBC 10*3/mm3 7.12 7.80   < > 9.56 15.75*   HEMOGLOBIN g/dL 12.1 12.0   < > 12.5 13.7   HEMATOCRIT % 36.8 36.0   < > 37.4 40.7   PLATELETS 10*3/mm3 409 357  --  388 380    < > = values in this interval not displayed.     Results from last 7 days   Lab Units 03/08/24  0844 03/06/24  0535 03/05/24  1545   SODIUM mmol/L 140 140 138   POTASSIUM mmol/L 3.1* 3.6 3.9   CHLORIDE mmol/L 108* 108* 103   CO2 mmol/L 22.0 24.0 25.0   BUN mg/dL 5* 4* 5*   CREATININE mg/dL 0.62 0.71 0.76   CALCIUM mg/dL 8.4* 8.5* 9.4   BILIRUBIN mg/dL  --   --  0.3   ALK PHOS U/L  --   --  94   ALT (SGPT) U/L  --   --  20   AST (SGOT) U/L  --    --  13   GLUCOSE mg/dL 100* 90 113*     I have personally reviewed 3/8 labs        JASWINDER Martinez  Acute Care General Surgery  [facility]    [address], [address]  [place], [place], [address]  P: [phone]  F: [phone]

## 2024-03-08 NOTE — PROGRESS NOTES
Name: Sushant Lala ADMIT: 3/5/2024   : 1980  PCP: Alissa Bourgeois MD    MRN: [id number] LOS: 0 days   AGE/SEX: 43 y.o. female  ROOM: [facility]     Subjective   Subjective   Feels a bit better and tolerating p.o.       Objective   Objective   Vital Signs  Temp:  [97.2 °F (36.2 °C)-98.4 °F (36.9 °C)] 97.2 °F (36.2 °C)  Heart Rate:  [59-86] 84  Resp:  [16] 16  BP: (136-150)/(76-86) 144/86  SpO2:  [92 %-95 %] 95 %  on   ;   Device (Oxygen Therapy): room air  Body mass index is 31.06 kg/m².  Physical Exam  Vitals reviewed.   Constitutional:       General: She is not in acute distress.  HENT:      Head: Normocephalic and atraumatic.   Eyes:      General: No scleral icterus.  Neck:      Vascular: No JVD.   Cardiovascular:      Rate and Rhythm: Normal rate.   Pulmonary:      Effort: Pulmonary effort is normal. No respiratory distress.   Musculoskeletal:         General: No swelling.   Skin:     Coloration: Skin is not jaundiced.      Findings: No rash.   Neurological:      Mental Status: She is alert and oriented to person, place, and time.   Psychiatric:         Mood and Affect: Mood normal.       Results Review     I reviewed the patient's new clinical results.  Results from last 7 days   Lab Units 24  0844 24  0535 24  1545 24  1018   WBC 10*3/mm3 7.12 7.80 9.56 15.75*   HEMOGLOBIN g/dL 12.1 12.0 12.5 13.7   PLATELETS 10*3/mm3 409 357 388 380     Results from last 7 days   Lab Units 24  0844 24  0535 24  1545   SODIUM mmol/L 140 140 138   POTASSIUM mmol/L 3.1* 3.6 3.9   CHLORIDE mmol/L 108* 108* 103   CO2 mmol/L 22.0 24.0 25.0   BUN mg/dL 5* 4* 5*   CREATININE mg/dL 0.62 0.71 0.76   GLUCOSE mg/dL 100* 90 113*   EGFR mL/min/1.73 113.5 108.3 99.9     Results from last 7 days   Lab Units 24  1545   ALBUMIN g/dL 3.8   BILIRUBIN mg/dL 0.3   ALK PHOS U/L 94   AST (SGOT) U/L 13   ALT (SGPT) U/L 20     Results from last 7 days   Lab Units 24  0844  "03/06/24  0535 03/05/24  1545 03/02/24  1018   CALCIUM mg/dL 8.4* 8.5* 9.4  --    ALBUMIN g/dL  --   --  3.8  --    MAGNESIUM mg/dL  --   --   --  1.8       No results found for: \"HGBA1C\", \"POCGLU\"    No radiology results for the last day    I have personally reviewed all medications:  Scheduled Medications  enoxaparin, 40 mg, Subcutaneous, Q24H  Hydrocortisone (Perianal), , Rectal, BID  levoFLOXacin, 750 mg, Oral, Q24H  metroNIDAZOLE, 500 mg, Oral, Q8H  pantoprazole, 40 mg, Oral, Q AM  polyethylene glycol, 17 g, Oral, Daily  potassium chloride, 40 mEq, Oral, Q4H  sertraline, 50 mg, Oral, Daily  sodium chloride, 10 mL, Intravenous, Q12H    Infusions   Diet  Diet: Gastrointestinal; Fiber-Restricted, Low Irritant; Fluid Consistency: Thin (IDDSI 0)    I have personally reviewed:  [x]  Laboratory   []  Microbiology   [x]  Radiology   []  EKG/Telemetry  []  Cardiology/Vascular   []  Pathology    []  Records       Assessment/Plan     Active Hospital Problems    Diagnosis  POA    **LLQ abdominal pain [R10.32]  Yes    Slow transit constipation [K59.01]  Unknown    Diverticulitis [K57.92]  Yes    Depression [F32.A]  Yes      Resolved Hospital Problems    Diagnosis Date Resolved POA    Dehydration [E86.0] 03/08/2024 Yes       43 y.o. female admitted with LLQ abdominal pain and likely acute diverticulitis, not responding to doxycycline started on outpatient setting    Symptoms stable/improving.  Basically day 3 of IV antibiotics at this point, with numerous listed drug allergies and adverse reactions.  ID note reviewed, planning trial oral Levaquin and Flagyl after discussion of prior reactions with patient.  Monitor closely in house today with these new antibiotics and potentially discharge tomorrow with them if she tolerates well    Outpatient follow-up for possible elective partial colectomy planned.  Probably needs a colonoscopy at some point as well    Replace electrolytes per protocol    Lovenox for DVT " prophylaxis  Disposition: Hopefully tomorrow home as above      Israel Balbuena MD  [facility]  03/08/24  15:47 EST

## 2024-03-08 NOTE — PROGRESS NOTES
[facility] Clinical Pharmacy Services: Vancomycin Level Monitoring Note    Sushant Lala is a 43 y.o. female who is on day 3/7 of pharmacy to dose vancomycin for Intra-Abdominal Infection.    Estimated Creatinine Clearance: 112.5 mL/min (by C-G formula based on SCr of 0.62 mg/dL).    Current Vanc Dose: 1250 mg IV every  12  hours  Results from last 7 days   Lab Units 03/08/24  0844   VANCOMYCIN TR mcg/mL 8.30   Plan: Will increase dose to 1500mg Q12h   Predicted AUC at new dose:427 mg/L.hr  Next Level Date and Time: Vanc Trough on 3/10 @ 0830    No changes at this time. Pharmacy is continuing to monitor and will adjust as needed.    Meagan Womack, PharmD  Clinical Pharmacist

## 2024-03-08 NOTE — PROGRESS NOTES
Chart round  Infectious Diseases Progress Note    Pretty eHaton MD     Psychiatric  Los: 0 days  Patient Identification:  Name: Sushant Lala  Age: 43 y.o.  Sex: female  :  1980  MRN: [id number]         Primary Care Physician: Alissa Bourgeois MD        Subjective: Feels a lot better with significant improvement in her abdominal pain and return of appetite.  Has not have any issues with current IV antibiotic therapy including Flagyl.  With the patient's caring nurse at the bedside extensive review of her antibiotic intolerances and reaction to certain antibiotics were performed.  Levaquin intolerance is described as follows:   According the patient she developed a local reaction at the IV site when she was given IV Levaquin in  and since then she has not taken any medication in that class including ciprofloxacin.  Patient did not have any issues with rash and difficulty breathing or sensation of swelling in her throat respiratory distress and the reaction at the IV site promptly resolved after the discontinuation of IV Levaquin.  Flagyl intolerances described as follows:  Patient describes taking oral Flagyl long time ago and not sure whether it was only Flagyl that she was taking but was taking other antibiotics also.  She did develop into rash rash due to Flagyl but did not have any difficulty breathing or sensation of closing of her throat.  Penicillin intolerances described as follows:   Penicillin patient developed rash as well as difficulty breathing and felt like her throat was closing and she has not taking any penicillin since but has tolerated Azactam.  Interval History: See initial consultation note.    Objective:    Scheduled Meds:aztreonam, 2,000 mg, Intravenous, Q8H  enoxaparin, 40 mg, Subcutaneous, Q24H  Hydrocortisone (Perianal), , Rectal, BID  metroNIDAZOLE, 500 mg, Intravenous, Q8H  pantoprazole, 40 mg, Oral, Q AM  polyethylene glycol, 17 g, Oral, Daily  sertraline, 50  "mg, Oral, Daily  sodium chloride, 10 mL, Intravenous, Q12H  vancomycin, 1,250 mg, Intravenous, Q12H      Continuous Infusions:Pharmacy to dose vancomycin,         Vital signs in last 24 hours:  Temp:  [97.5 °F (36.4 °C)-98.4 °F (36.9 °C)] 97.5 °F (36.4 °C)  Heart Rate:  [59-86] 86  Resp:  [16] 16  BP: (136-150)/(76-83) 146/83    Intake/Output:  No intake or output data in the 24 hours ending 03/08/24 0907      Exam: Examination from other providers reviewed  /83 (BP Location: Left arm, Patient Position: Sitting)   Pulse 86   Temp 97.5 °F (36.4 °C) (Oral)   Resp 16   Ht 157.5 cm (62\")   Wt 77 kg (169 lb 12.8 oz)   LMP  (LMP Unknown)   SpO2 92%   BMI 31.06 kg/m²   Patient is examined using the personal protective equipment as per guidelines from infection control for this particular patient as enacted.  Hand washing was performed before and after patient interaction.  General Appearance:  Appears to have a better color and comfortable                          Head:    Normocephalic, without obvious abnormality, atraumatic                           Eyes:    PERRL, conjunctivae/corneas clear, EOM's intact, both eyes                         Throat:   Lips, tongue, gums normal; oral mucosa pink and moist                           Neck:   Supple, symmetrical, trachea midline, no JVD                         Lungs:    Clear to auscultation bilaterally, respirations unlabored                 Chest Wall:    No tenderness or deformity                          Heart:  S1-S2 regular                  Abdomen:   Decreased abdominal tenderness.                 Extremities:   Extremities normal, atraumatic, no cyanosis or edema                        Pulses:   Pulses palpable in all extremities                            Skin:   Skin is warm and dry,  no rashes or palpable lesions                  Neurologic: Awake alert interactive no focal abnormalities       Data Review:    I reviewed the patient's new clinical " results.  Results from last 7 days   Lab Units 03/08/24  0844 03/06/24  0535 03/05/24  1545 03/02/24  1018   WBC 10*3/mm3 7.12 7.80 9.56 15.75*   HEMOGLOBIN g/dL 12.1 12.0 12.5 13.7   PLATELETS 10*3/mm3 409 357 388 380     Results from last 7 days   Lab Units 03/06/24  0535 03/05/24  1545   SODIUM mmol/L 140 138   POTASSIUM mmol/L 3.6 3.9   CHLORIDE mmol/L 108* 103   CO2 mmol/L 24.0 25.0   BUN mg/dL 4* 5*   CREATININE mg/dL 0.71 0.76   CALCIUM mg/dL 8.5* 9.4   GLUCOSE mg/dL 90 113*     Microbiology Results (last 10 days)       ** No results found for the last 240 hours. **              Assessment:    LLQ abdominal pain    Diverticulitis    Depression    Dehydration    Slow transit constipation  1-acute sigmoid diverticulitis with recurrent episodes in the past  2-abnormal lung imaging concerning for multifocal pneumonia  3-multiple antibiotic allergies and intolerance  4-possible primary lumbar process with radiculopathy  5-other diagnoses per primary team.     Recommendations/Discussions:  I had a long discussion with the patient about difficulty in treating her diverticulitis responding to IV antibiotic therapy and out of hospital setting because of various  intolerances and allergic reaction to different antibiotic classes.  After having reviewed her Levaquin and Flagyl reaction it is proposed that maybe trying oral Levaquin and Flagyl while she is in the hospital to see if the reaction to IV Levaquin was more of a local infiltrative reaction in 2012 and reaction to oral Flagyl in the past could be due to something else that she was taking in association with Flagyl as if it was a true Flagyl allergy and rash caused by Flagyl then she would have the same issue while receiving IV Flagyl in the hospital which he seems to have tolerated well in the last 2-1/2 days.  Patient is agreeable to try oral Flagyl and oral Levaquin with her being closely watched.  Side effects of antibiotic therapy in general such as nausea  vomiting diarrhea rash risk of C. difficile infection selection of resistant pathogens hepatic and renal dysfunction cytopenias and drug fever interaction with other medication discussed with the patient and she understands that the side effects could occur but also she understands that she needs antibiotic therapy and wants to proceed.  Specific side effects of Levaquin such as QTc prolongation, ligament and tendon injury potential, neuropsychiatric symptoms, discussed with the patient and she is currently provided with literature to read these specific FDA warnings for quinolones and also the understanding that choices are limited and there is no significant suitable alternatives available except to go with IV antibiotic therapy.  Pretty Heaton MD  3/8/2024  09:07 EST    Parts of this note may be an electronic transcription/translation of spoken language to printed text using the Dragon dictation system.

## 2024-03-08 NOTE — PLAN OF CARE
Goal Outcome Evaluation:  Plan of Care Reviewed With: patient        Progress: improving  Outcome Evaluation: Patient is alert and oriented x4, VSS, NSR on telemetry, room air, up ad pallavi and frequently walks around unit. Diet advanced- tolerating well. Antibiotics switched to oral. Some complaints of ankle pain- PRN medication given and ice pack provided. Bed in lowest position and call light within reach.

## 2024-03-08 NOTE — PLAN OF CARE
Problem: Adult Inpatient Plan of Care  Goal: Absence of Hospital-Acquired Illness or Injury  Intervention: Identify and Manage Fall Risk  Recent Flowsheet Documentation  Taken 3/8/2024 0400 by Julio Vasquez RN  Safety Promotion/Fall Prevention:   safety round/check completed   room organization consistent   nonskid shoes/slippers when out of bed   lighting adjusted   assistive device/personal items within reach   clutter free environment maintained  Taken 3/8/2024 0200 by Julio Vasquez, RN  Safety Promotion/Fall Prevention:   safety round/check completed   room organization consistent   nonskid shoes/slippers when out of bed   lighting adjusted   clutter free environment maintained   assistive device/personal items within reach  Taken 3/8/2024 0000 by Julio Vasquez RN  Safety Promotion/Fall Prevention:   safety round/check completed   room organization consistent   nonskid shoes/slippers when out of bed   lighting adjusted   clutter free environment maintained   assistive device/personal items within reach  Taken 3/7/2024 2200 by Julio Vasquez RN  Safety Promotion/Fall Prevention:   safety round/check completed   room organization consistent   nonskid shoes/slippers when out of bed   lighting adjusted   clutter free environment maintained   assistive device/personal items within reach  Taken 3/7/2024 2000 by Julio Vasquez RN  Safety Promotion/Fall Prevention:   safety round/check completed   room organization consistent   nonskid shoes/slippers when out of bed   lighting adjusted   clutter free environment maintained   assistive device/personal items within reach     Problem: Adult Inpatient Plan of Care  Goal: Absence of Hospital-Acquired Illness or Injury  Intervention: Prevent and Manage VTE (Venous Thromboembolism) Risk  Recent Flowsheet Documentation  Taken 3/8/2024 0400 by Julio Vasquez RN  Activity Management: up ad pallavi  Taken 3/8/2024 0200 by  Julio Vasquez, RN  Activity Management: up ad pallavi  Taken 3/8/2024 0000 by Julio Vasquez RN  Activity Management: up ad pallavi  Taken 3/7/2024 2200 by Julio Vasquez, HAYLEE  Activity Management: up ad pallavi  Taken 3/7/2024 2000 by Julio Vasquez, HAYLEE  Activity Management: up ad pallavi  Range of Motion: active ROM (range of motion) encouraged   Goal Outcome Evaluation:  Plan of Care Reviewed With: patient        Progress: improving  Outcome Evaluation: AOx4, VSS, SR on telemetry monitor, room air, up ad pallavi to bathroom, no c/o pain and distress, slept throughout the night, bed on lowest position, call light within reach.

## 2024-03-08 NOTE — NURSING NOTE
RN present in room when ID Dr. Heaton discussed patient's allergy history and antibiotic intolerances and reaction.   Patient states that with administration of IV Levaquin she developed a reaction at the IV site that resolved when IV Levaquin was discontinued. Patient did not report any feelings of swelling in throat or respiratory distress.   Patient states that when taking oral Flagyl she developed a rash, but stated that she was taking other antibiotics as well. Patient did not report any feelings of swelling in throat or respiratory distress. Patient has been receiving flagyl while in hospital.   Patient states when taking penicillin she developed rash and felt like her throat was closing- has not taken any penicillin since.     Dr. Heaton discussed trying oral Levaquin and Flagyl while being monitored in the hospital. Patient is agreeable to try oral Levaquin and Flagyl.

## 2024-03-09 VITALS
DIASTOLIC BLOOD PRESSURE: 88 MMHG | OXYGEN SATURATION: 96 % | HEART RATE: 83 BPM | BODY MASS INDEX: 30.97 KG/M2 | TEMPERATURE: 97.7 F | SYSTOLIC BLOOD PRESSURE: 108 MMHG | WEIGHT: 168.3 LBS | HEIGHT: 62 IN | RESPIRATION RATE: 18 BRPM

## 2024-03-09 LAB — POTASSIUM SERPL-SCNC: 3.8 MMOL/L (ref 3.5–5.2)

## 2024-03-09 PROCEDURE — G0378 HOSPITAL OBSERVATION PER HR: HCPCS

## 2024-03-09 PROCEDURE — 84132 ASSAY OF SERUM POTASSIUM: CPT | Performed by: HOSPITALIST

## 2024-03-09 RX ORDER — POLYETHYLENE GLYCOL 3350 17 G/17G
17 POWDER, FOR SOLUTION ORAL DAILY
Qty: 510 G | Refills: 0 | Status: SHIPPED | OUTPATIENT
Start: 2024-03-09

## 2024-03-09 RX ORDER — LEVOFLOXACIN 750 MG/1
750 TABLET, FILM COATED ORAL EVERY 24 HOURS
Qty: 4 TABLET | Refills: 0 | Status: SHIPPED | OUTPATIENT
Start: 2024-03-09 | End: 2024-03-13

## 2024-03-09 RX ORDER — METRONIDAZOLE 500 MG/1
500 TABLET ORAL EVERY 8 HOURS SCHEDULED
Qty: 12 TABLET | Refills: 0 | Status: SHIPPED | OUTPATIENT
Start: 2024-03-09 | End: 2024-03-13

## 2024-03-09 RX ADMIN — PANTOPRAZOLE SODIUM 40 MG: 40 TABLET, DELAYED RELEASE ORAL at 05:16

## 2024-03-09 RX ADMIN — METRONIDAZOLE 500 MG: 500 TABLET, FILM COATED ORAL at 05:16

## 2024-03-09 RX ADMIN — SERTRALINE 50 MG: 50 TABLET, FILM COATED ORAL at 10:05

## 2024-03-09 RX ADMIN — HYDROCORTISONE: 25 CREAM TOPICAL at 10:07

## 2024-03-09 RX ADMIN — Medication 10 ML: at 10:05

## 2024-03-09 NOTE — PLAN OF CARE
Goal Outcome Evaluation:  Plan of Care Reviewed With: patient        Progress: no change  Outcome Evaluation:     A&O x4. VSS. SR on telemetry. Room air.       Up adlib to restroom.   BM x2 per pt.     Ortho boot to left foot for ankle fx  per pt, she has to wear for 5 more weeks.     PO abx given and tolerating.     K replaced.     Daily weights.       No complaints of pain.   No signs of distress.       Will continue to monitor.           Possible d/c today?

## 2024-03-09 NOTE — DISCHARGE SUMMARY
Patient Name: Sushant Lala  : 1980  MRN: [id number]    Date of Admission: 3/5/2024  Date of Discharge:  3/9/2024  Primary Care Physician: Alissa Bourgeois MD      Chief Complaint:   Back Pain and Leg Pain (/)      Discharge Diagnoses     Active Hospital Problems    Diagnosis  POA    **LLQ abdominal pain [R10.32]  Yes    Slow transit constipation [K59.01]  Yes    Diverticulitis [K57.92]  Yes    Depression [F32.A]  Yes      Resolved Hospital Problems    Diagnosis Date Resolved POA    Dehydration [E86.0] 2024 Yes        Hospital Course     Ms. Lala is a 43 y.o. female with a history of recurrent diverticulitis who presented to ED with abdominal pain. She had already been on doxycycline prior to admit for diverticulitis but was not improving. She had numerous drug allergies so was started on Azactam, IV Flagyl and Vanc after discussion with ID. She tolerated this regimen well. After discussion with her it was determined that her reaction previously was to IV Levaquin only. She was started on po Levaquin and had no reaction. Surgery saw her and is planning outpatient colonoscopy followed by elective partial colectomy. She has been given instructions for follow up and is stable to d/c today.    Day of Discharge     Subjective:  No pain    Physical Exam:  Temp:  [97.7 °F (36.5 °C)-97.9 °F (36.6 °C)] 97.7 °F (36.5 °C)  Heart Rate:  [72-89] 83  Resp:  [16-18] 18  BP: (108-138)/(62-88) 108/88  Body mass index is 30.78 kg/m².  Physical Exam  Vitals reviewed.   Constitutional:       General: She is not in acute distress.  HENT:      Head: Normocephalic and atraumatic.   Eyes:      General: No scleral icterus.  Neck:      Vascular: No JVD.   Cardiovascular:      Rate and Rhythm: Normal rate.   Pulmonary:      Effort: Pulmonary effort is normal. No respiratory distress.   Musculoskeletal:         General: No swelling.   Skin:     Coloration: Skin is not jaundiced.      Findings: No rash.    Neurological:      Mental Status: She is alert and oriented to person, place, and time.   Psychiatric:         Mood and Affect: Mood normal.         Consultants     Consult Orders (all) (From admission, onward)       Start     Ordered    03/06/24 1319  Inpatient General Surgery Consult  Once        Specialty:  General Surgery  Provider:  Christine Morse MD    03/06/24 1319 03/05/24 2034  Inpatient Infectious Diseases Consult  Once        Specialty:  Infectious Diseases  Provider:  Pretty Heaton MD    03/05/24 2033 03/05/24 1929  [facility] (on-call MD unless specified) Details  Once,   Status:  Canceled        Specialty:  Hospitalist  Provider:  (Not yet assigned)    03/05/24 1929                  Procedures     * Surgery not found *    Imaging Results (All)       Procedure Component Value Units Date/Time    CT Abdomen Pelvis Without Contrast [[id number]] Collected: 03/05/24 1828     Updated: 03/05/24 1841    Narrative:      CT ABDOMEN AND PELVIS WITHOUT IV CONTRAST     HISTORY: Left-sided abdominal and back pain     TECHNIQUE: Radiation dose reduction techniques were utilized, including  automated exposure control and exposure modulation based on body size.   3 mm images were obtained through the abdomen and pelvis without IV  contrast.     COMPARISON: None     FINDINGS:  Patchy groundglass and pulmonary opacification is present throughout the  visualized lung bases, some areas of which have a nodular appearance.  There are no findings of small bowel obstruction. The appendix is  unremarkable. The liver, gallbladder, pancreas, spleen and adrenal  glands have an unremarkable noncontrast CT appearance. Hypodense right  renal lesion demonstrating density less than 15 Hounsfield images likely  benign per Bosniak 2019 criteria. No hydronephrosis. The bladder is  underdistended and not well evaluated. There is an intrauterine device.     No abdominal pelvic adenopathy by size criteria. There is colonic  diverticulosis.  "Short segment of wall thickening with pericolonic fat  stranding involves the sigmoid colon. No definite free intraperitoneal  air is seen. No suspicious lytic or blastic osseous lesions.       Impression:      1.  Findings of acute diverticulitis involving the sigmoid colon. Please  note evaluation is suboptimal intravenous contrast. No definite free  intraperitoneal air is seen.  2.  Findings most concerning for multifocal pneumonia within the  bilateral lung bases, incompletely visualized. Asymmetric edema is less  likely. Correlation with patient history is recommended with at least  follow-up with chest CT in 6 to 8 weeks to ensure resolution given the  somewhat nodular appearance of multiple areas.  3.  Other findings as above.           This report was finalized on 3/5/2024 6:38 PM by Dr. Prosper Richardson M.D  on Workstation: [id number]                 Pertinent Labs     Results from last 7 days   Lab Units 03/08/24  0844 03/06/24  0535 03/05/24  1545   WBC 10*3/mm3 7.12 7.80 9.56   HEMOGLOBIN g/dL 12.1 12.0 12.5   PLATELETS 10*3/mm3 409 357 388     Results from last 7 days   Lab Units 03/09/24  0707 03/08/24  0844 03/06/24  0535 03/05/24  1545   SODIUM mmol/L  --  140 140 138   POTASSIUM mmol/L 3.8 3.1* 3.6 3.9   CHLORIDE mmol/L  --  108* 108* 103   CO2 mmol/L  --  22.0 24.0 25.0   BUN mg/dL  --  5* 4* 5*   CREATININE mg/dL  --  0.62 0.71 0.76   GLUCOSE mg/dL  --  100* 90 113*   EGFR mL/min/1.73  --  113.5 108.3 99.9     Results from last 7 days   Lab Units 03/05/24  1545   ALBUMIN g/dL 3.8   BILIRUBIN mg/dL 0.3   ALK PHOS U/L 94   AST (SGOT) U/L 13   ALT (SGPT) U/L 20     Results from last 7 days   Lab Units 03/08/24  0844 03/06/24  0535 03/05/24  1545   CALCIUM mg/dL 8.4* 8.5* 9.4   ALBUMIN g/dL  --   --  3.8     Results from last 7 days   Lab Units 03/05/24  1545   LIPASE U/L 13             Invalid input(s): \"LDLCALC\"          Test Results Pending at Discharge       Discharge Details        Discharge " Medications        New Medications        Instructions Start Date   levoFLOXacin 750 MG tablet  Commonly known as: LEVAQUIN   750 mg, Oral, Every 24 Hours      metroNIDAZOLE 500 MG tablet  Commonly known as: FLAGYL   500 mg, Oral, Every 8 Hours Scheduled      polyethylene glycol 17 GM/SCOOP powder  Commonly known as: MIRALAX   17 g, Oral, Daily             Continue These Medications        Instructions Start Date   acetaminophen 650 MG 8 hr tablet  Commonly known as: TYLENOL   Oral      amantadine 100 MG tablet  Commonly known as: SYMMETREL   No dose, route, or frequency recorded.      cetirizine 5 MG tablet  Commonly known as: zyrTEC   5 mg, Oral, Daily      diclofenac 75 MG EC tablet  Commonly known as: VOLTAREN   75 mg, Oral      HYDROcodone-acetaminophen 5-325 MG per tablet  Commonly known as: NORCO   1 tablet, Oral      ondansetron ODT 4 MG disintegrating tablet  Commonly known as: ZOFRAN-ODT   4 mg, Oral      sertraline 50 MG tablet  Commonly known as: ZOLOFT   50 mg, Oral, Daily             Stop These Medications      dicyclomine 20 MG tablet  Commonly known as: BENTYL     doxycycline 100 MG tablet  Commonly known as: ADOXA     levonorgestrel 20 MCG/24HR IUD  Commonly known as: Mirena (52 MG)              Allergies   Allergen Reactions    Penicillins Anaphylaxis    Levaquin [Levofloxacin] Rash    Metronidazole Rash     Tolerated during admission 3/5/2024       Discharge Disposition:  Home or Self Care      Discharge Diet:  No active diet order      Discharge Activity:       CODE STATUS:    Code Status and Medical Interventions:   Ordered at: 03/05/24 2033     Code Status (Patient has no pulse and is not breathing):    CPR (Attempt to Resuscitate)     Medical Interventions (Patient has pulse or is breathing):    Full Support       No future appointments.   Follow-up Information       Alissa Bourgeois MD Follow up in 1 week(s).    Specialty: Internal Medicine  Contact information:  [address]  [facility] [id number] [phone]               Christine Morse MD Follow up in 2 week(s).    Specialty: General Surgery  Contact information:  [address] [facility] [address]  [facility] [address] [phone]                             Time Spent on Discharge:  Greater than 30 minutes      Israel Balbuena MD  [facility]  03/09/24  17:25 EST

## 2024-03-09 NOTE — NURSING NOTE
Went over d/c paperwork w/ pt. Pt understands and has no further questions at this time. Pt has all belongings and is going home. D/C IV

## 2024-03-09 NOTE — PROGRESS NOTES
Chart round  Infectious Diseases Progress Note    Pretty Heaton MD     [facility]  Los: 0 days  Patient Identification:  Name: Sushant Lala  Age: 43 y.o.  Sex: female  :  1980  MRN: [id number]         Primary Care Physician: Alissa Bourgeois MD        Subjective: Continues to tolerated oral Levaquin and Flagyl without any trouble.  Appetite is better.  Interval history:  On 3/8/2024 detailed history is gathered for specific reaction to various antimicrobials:  Levaquin intolerance is described as follows:   According the patient she developed a local reaction at the IV site when she was given IV Levaquin in  and since then she has not taken any medication in that class including ciprofloxacin.  Patient did not have any issues with rash and difficulty breathing or sensation of swelling in her throat respiratory distress and the reaction at the IV site promptly resolved after the discontinuation of IV Levaquin.  Flagyl intolerances described as follows:  Patient describes taking oral Flagyl long time ago and not sure whether it was only Flagyl that she was taking but was taking other antibiotics also.  She did develop into rash rash due to Flagyl but did not have any difficulty breathing or sensation of closing of her throat.  Penicillin intolerances described as follows:   Penicillin patient developed rash as well as difficulty breathing and felt like her throat was closing and she has not taking any penicillin since but has tolerated Azactam.  On 3/8/2024 IV antibiotic was switched to Levaquin and Flagyl  Objective:    Scheduled Meds:enoxaparin, 40 mg, Subcutaneous, Q24H  Hydrocortisone (Perianal), , Rectal, BID  levoFLOXacin, 750 mg, Oral, Q24H  metroNIDAZOLE, 500 mg, Oral, Q8H  pantoprazole, 40 mg, Oral, Q AM  polyethylene glycol, 17 g, Oral, Daily  sertraline, 50 mg, Oral, Daily  sodium chloride, 10 mL, Intravenous, Q12H      Continuous Infusions:       Vital signs in last 24  "hours:  Temp:  [97.2 °F (36.2 °C)-97.9 °F (36.6 °C)] 97.7 °F (36.5 °C)  Heart Rate:  [72-89] 83  Resp:  [16-18] 18  BP: (108-144)/(62-88) 108/88    Intake/Output:    Intake/Output Summary (Last 24 hours) at 3/9/2024 0845  Last data filed at 3/8/2024 2000  Gross per 24 hour   Intake 360 ml   Output --   Net 360 ml         Exam: Examination from other providers reviewed  /88 (BP Location: Right arm, Patient Position: Lying)   Pulse 83   Temp 97.7 °F (36.5 °C) (Oral)   Resp 18   Ht 157.5 cm (62\")   Wt 76.3 kg (168 lb 4.8 oz)   LMP  (LMP Unknown)   SpO2 96%   BMI 30.78 kg/m²   Patient is examined using the personal protective equipment as per guidelines from infection control for this particular patient as enacted.  Hand washing was performed before and after patient interaction.  General Appearance:  Feeling well and just finished her breakfast.  And tolerated.                          Head:    Normocephalic, without obvious abnormality, atraumatic                           Eyes:    PERRL, conjunctivae/corneas clear, EOM's intact, both eyes                         Throat:   Lips, tongue, gums normal; oral mucosa pink and moist                           Neck:   Supple, symmetrical, trachea midline, no JVD                         Lungs:    Clear to auscultation bilaterally, respirations unlabored                 Chest Wall:    No tenderness or deformity                          Heart:  S1-S2 regular                  Abdomen:   Decreased abdominal tenderness.                 Extremities:   Extremities normal, atraumatic, no cyanosis or edema                        Pulses:   Pulses palpable in all extremities                            Skin:   Skin is warm and dry,  no rashes or palpable lesions                  Neurologic: Awake alert interactive no focal abnormalities       Data Review:    I reviewed the patient's new clinical results.  Results from last 7 days   Lab Units 03/08/24  0844 03/06/24  0535 " 03/05/24  1545 03/02/24  1018   WBC 10*3/mm3 7.12 7.80 9.56 15.75*   HEMOGLOBIN g/dL 12.1 12.0 12.5 13.7   PLATELETS 10*3/mm3 409 357 388 380     Results from last 7 days   Lab Units 03/09/24  0707 03/08/24  0844 03/06/24  0535 03/05/24  1545   SODIUM mmol/L  --  140 140 138   POTASSIUM mmol/L 3.8 3.1* 3.6 3.9   CHLORIDE mmol/L  --  108* 108* 103   CO2 mmol/L  --  22.0 24.0 25.0   BUN mg/dL  --  5* 4* 5*   CREATININE mg/dL  --  0.62 0.71 0.76   CALCIUM mg/dL  --  8.4* 8.5* 9.4   GLUCOSE mg/dL  --  100* 90 113*     Microbiology Results (last 10 days)       ** No results found for the last 240 hours. **              Assessment:    LLQ abdominal pain    Diverticulitis    Depression    Slow transit constipation  1-acute sigmoid diverticulitis with recurrent episodes in the past  2-abnormal lung imaging concerning for multifocal pneumonia  3-multiple antibiotic allergies and intolerance  4-possible primary lumbar process with radiculopathy  5-other diagnoses per primary team.     Recommendations/Discussions:  Discussion and recommendations on 3/8/2024.  Patient seems to have tolerated oral Levaquin and Flagyl without any problem.  Patient is educated about interaction of Flagyl with alcohol and I think it needs to be reiterated to her caregiver that any alcoholic beverage or medications that has alcohol content in it when taken with Flagyl can cause disulfiram type reaction such as nausea and vomiting which may be construed as allergic reaction.  Patient need to avoid alcoholic beverages throughout the duration of Flagyl and at least 48 to 72 hours after its last dose.  Patient has reviewed information about Levaquin provided on 3/8/2024 and understands the FDA warnings and side effects and also understands the need for antibiotic therapy and associated risk that comes with it.  Patient wants to proceed with Levaquin and Flagyl.  7 days of Levaquin and Flagyl continue for 10-day course of antibiotic treatment counting  the IV antibiotics that she has received in the hospital.  Out of hospital follow-up as per her primary care provider and general surgery service.  Pretty Heaton MD  3/9/2024  08:45 EST    Parts of this note may be an electronic transcription/translation of spoken language to printed text using the Dragon dictation system.

## 2024-03-10 NOTE — CASE MANAGEMENT/SOCIAL WORK
Case Management Discharge Note      Final Note: home w/ family    Provided Post Acute Provider List?: N/A  Provided Post Acute Provider Quality & Resource List?: N/A    Selected Continued Care - Discharged on 3/9/2024 Admission date: 3/5/2024 - Discharge disposition: Home or Self Care      Destination    No services have been selected for the patient.                Durable Medical Equipment    No services have been selected for the patient.                Dialysis/Infusion    No services have been selected for the patient.                Home Medical Care    No services have been selected for the patient.                Therapy    No services have been selected for the patient.                Community Resources    No services have been selected for the patient.                Community & DME    No services have been selected for the patient.                    Transportation Services  Private: Car    Final Discharge Disposition Code: 01 - home or self-careCase Management Discharge Note      Final Note: home w/ family    Provided Post Acute Provider List?: N/A  Provided Post Acute Provider Quality & Resource List?: N/A    Selected Continued Care - Discharged on 3/9/2024 Admission date: 3/5/2024 - Discharge disposition: Home or Self Care      Destination    No services have been selected for the patient.                Durable Medical Equipment    No services have been selected for the patient.                Dialysis/Infusion    No services have been selected for the patient.                Home Medical Care    No services have been selected for the patient.                Therapy    No services have been selected for the patient.                Community Resources    No services have been selected for the patient.                Community & DME    No services have been selected for the patient.                    Transportation Services  Private: Car    Final Discharge Disposition Code: 01 - home or self-care

## 2024-04-02 ENCOUNTER — PREP FOR SURGERY (OUTPATIENT)
Dept: OTHER | Facility: HOSPITAL | Age: 44
End: 2024-04-02
Payer: COMMERCIAL

## 2024-04-02 ENCOUNTER — OFFICE VISIT (OUTPATIENT)
Dept: SURGERY | Facility: CLINIC | Age: 44
End: 2024-04-02
Payer: COMMERCIAL

## 2024-04-02 VITALS
WEIGHT: 169.4 LBS | HEIGHT: 62 IN | BODY MASS INDEX: 31.17 KG/M2 | SYSTOLIC BLOOD PRESSURE: 118 MMHG | DIASTOLIC BLOOD PRESSURE: 82 MMHG

## 2024-04-02 DIAGNOSIS — K57.92 DIVERTICULITIS: Primary | ICD-10-CM

## 2024-04-02 DIAGNOSIS — Z72.0 TOBACCO USE: ICD-10-CM

## 2024-04-02 DIAGNOSIS — E66.09 CLASS 1 OBESITY DUE TO EXCESS CALORIES WITHOUT SERIOUS COMORBIDITY WITH BODY MASS INDEX (BMI) OF 30.0 TO 30.9 IN ADULT: ICD-10-CM

## 2024-04-02 PROCEDURE — 99214 OFFICE O/P EST MOD 30 MIN: CPT | Performed by: SURGERY

## 2024-04-02 NOTE — PROGRESS NOTES
General Surgery Initial Office Visit    Referring Provider: Angeles Molina PA-C    Chief Complaint:    Diverticulitis follow-up    History of Present Illness:    Sushant Lala is a 43 y.o. female who was seen as an inpatient consult for recurrent uncomplicated diverticulitis.  Since she has had multiple bouts of diverticulitis requiring hospitalization and IV antibiotics, I recommended follow-up for discussion of sigmoidectomy.  Since discharge, she reports some abdominal pain but not as severe as before hospitalization.  She reports pain was worse after eating raw broccoli.  No nausea or vomiting.    Past Medical History:   Allergic rhinitis  Anxiety  Diverticulitis  Uterine mass (found at age 12)    Past Surgical History:    Adenoidectomy, tonsillectomy  Myringotomy with tubes  Colonoscopy (over 10 years ago)    Family History:    Family History   Problem Relation Age of Onset    Diabetes Father     Breast cancer Mother     Hypertension Mother     Diabetes Mother     Kidney failure Paternal Grandmother     Breast cancer Maternal Grandmother     Colon cancer Neg Hx        Social History:     Works as a manager at [employer]  Current daily cigarette use (1/4 pack per day)  Occasional alcohol use  Occasional marijuana use    Allergies:   Allergies   Allergen Reactions    Penicillins Anaphylaxis    Levaquin [Levofloxacin] Rash    Metronidazole Rash     Tolerated during admission 3/5/2024       Medications:     Current Outpatient Medications:     acetaminophen (TYLENOL) 650 MG 8 hr tablet, Take 1 tablet by mouth Every 8 (Eight) Hours As Needed., Disp: , Rfl:     sertraline (ZOLOFT) 50 MG tablet, Take 1 tablet by mouth Daily., Disp: , Rfl:     Radiology/Endoscopy:    No new radiology to review  No prior endoscopy to review    Labs:    No labs since discharge    Review of Systems:   Influenza-like illness: no fever, no  cough, no  sore throat, no  body aches, no loss of sense of taste or smell, no known  exposure to person with Covid-19.  Constitutional: Negative for fevers or chills  HENT: Negative for hearing loss or runny nose  Eyes: Negative for vision changes or scleral icterus  Respiratory: Negative for cough or shortness of breath  Cardiovascular: Negative for chest pain or heart palpitations  Gastrointestinal: Positive for abdominal pain, negative for nausea, vomiting, constipation, melena, or hematochezia  Genitourinary: Negative for hematuria or dysuria  Musculoskeletal: Negative for joint swelling or gait instability  Neurologic: Negative for tremors or seizures  Psychiatric: Negative for suicidal ideations or depression  All other systems reviewed and negative    Physical Exam:   Body mass index is 30.98 kg/m².  Constitutional: Well-developed well-nourished, no acute distress  Eyes: Conjunctiva normal, sclera nonicteric  ENMT: Hearing grossly normal, oral mucosa moist  Neck: Supple, trachea midline  Respiratory: No increased work of breathing, normal inspiratory effort  Cardiovascular: Regular rate, no peripheral edema, no jugular venous distention  Gastrointestinal: Soft, nontender, nondistended  Skin:  Warm, dry, no rash on visualized skin surfaces  Musculoskeletal: Symmetric strength, normal gait  Psychiatric: Alert and oriented ×3, normal affect     BMI is >= 30 and <35. (Class 1 Obesity). The following options were offered after discussion;: weight loss educational material (shared in after visit summary)    Assessment/Plan:  1.  Recurrent uncomplicated diverticulitis  I explained the rationale for offering her a sigmoidectomy, given multiple recurrent bouts of diverticulitis requiring hospitalization and IV antibiotics over the past 10 years.  I recommended beginning with a colonoscopy prior to any surgical intervention since it has been at least 10 years.  Risk of colonoscopy (bleeding, perforation, polypectomy) was explained to the patient in detail who verbalized understanding and elected to  proceed.    We discussed a da Scott assisted laparoscopic sigmoidectomy with possible diverting loop ileostomy.  I explained the procedure in detail and answered all of the patient's and her family's questions. All risks (including bleeding, infection, damage to surrounding structures, anastomotic leak rate of 5 to 20%, need for diverting loop ileostomy), benefits, and alternatives were explained to the patient as well.  I also emphasized the importance of smoking cessation to help reduce the risk of anastomotic leak and postoperative wound complications.  I encouraged her to quit for at least 2 weeks prior to the procedure in order to benefit from cessation.    She will follow-up with me in office after colonoscopy to discuss the findings and further discuss sigmoidectomy prior to scheduling.    I spent 34 minutes caring for Sushant Lala on this date of service. This time includes time spent by me in the following activities: preparing for the visit, reviewing tests,  counseling and educating the patient/family, referring and communicating with other health care professionals, documenting information in the medical record and care coordination.       HARRIET BAEZ M.D.  General, Robotic, and Endoscopic Surgery  [facility]    [address] [place] [address], [address]  [place], [place], [address]  P: [phone]  F: [phone]

## 2024-04-30 ENCOUNTER — TRANSCRIBE ORDERS (OUTPATIENT)
Dept: ADMINISTRATIVE | Facility: HOSPITAL | Age: 44
End: 2024-04-30
Payer: COMMERCIAL

## 2024-04-30 DIAGNOSIS — Z87.01 HISTORY OF PNEUMONIA: Primary | ICD-10-CM

## 2024-05-06 ENCOUNTER — TELEPHONE (OUTPATIENT)
Dept: SURGERY | Facility: CLINIC | Age: 44
End: 2024-05-06
Payer: COMMERCIAL

## 2024-05-07 RX ORDER — TRAZODONE HYDROCHLORIDE 50 MG/1
50 TABLET ORAL NIGHTLY
COMMUNITY
Start: 2024-04-30

## 2024-05-07 RX ORDER — SERTRALINE HYDROCHLORIDE 100 MG/1
100 TABLET, FILM COATED ORAL DAILY
COMMUNITY
Start: 2024-04-30

## 2024-05-08 ENCOUNTER — HOSPITAL ENCOUNTER (OUTPATIENT)
Facility: HOSPITAL | Age: 44
Setting detail: HOSPITAL OUTPATIENT SURGERY
Discharge: HOME | End: 2024-05-08
Attending: SURGERY | Admitting: SURGERY
Payer: COMMERCIAL

## 2024-05-08 ENCOUNTER — ANESTHESIA EVENT (OUTPATIENT)
Dept: GASTROENTEROLOGY | Facility: HOSPITAL | Age: 44
End: 2024-05-08
Payer: COMMERCIAL

## 2024-05-08 ENCOUNTER — ANESTHESIA (OUTPATIENT)
Dept: GASTROENTEROLOGY | Facility: HOSPITAL | Age: 44
End: 2024-05-08
Payer: COMMERCIAL

## 2024-05-08 VITALS
WEIGHT: 165.3 LBS | TEMPERATURE: 97.9 F | DIASTOLIC BLOOD PRESSURE: 92 MMHG | HEIGHT: 62 IN | BODY MASS INDEX: 30.42 KG/M2 | SYSTOLIC BLOOD PRESSURE: 138 MMHG | OXYGEN SATURATION: 94 % | HEART RATE: 66 BPM | RESPIRATION RATE: 19 BRPM

## 2024-05-08 LAB
B-HCG UR QL: NEGATIVE
EXPIRATION DATE: NORMAL
INTERNAL NEGATIVE CONTROL: NEGATIVE
INTERNAL POSITIVE CONTROL: POSITIVE
Lab: NORMAL

## 2024-05-08 PROCEDURE — S0260 H&P FOR SURGERY: HCPCS | Performed by: SURGERY

## 2024-05-08 PROCEDURE — 45378 DIAGNOSTIC COLONOSCOPY: CPT | Performed by: SURGERY

## 2024-05-08 PROCEDURE — 81025 URINE PREGNANCY TEST: CPT | Performed by: SURGERY

## 2024-05-08 PROCEDURE — 25010000002 PROPOFOL 10 MG/ML EMULSION: Performed by: NURSE ANESTHETIST, CERTIFIED REGISTERED

## 2024-05-08 PROCEDURE — 25810000003 LACTATED RINGERS PER 1000 ML: Performed by: SURGERY

## 2024-05-08 RX ORDER — SODIUM CHLORIDE, SODIUM LACTATE, POTASSIUM CHLORIDE, CALCIUM CHLORIDE 600; 310; 30; 20 MG/100ML; MG/100ML; MG/100ML; MG/100ML
1000 INJECTION, SOLUTION INTRAVENOUS CONTINUOUS
Status: DISCONTINUED | OUTPATIENT
Start: 2024-05-08 | End: 2024-05-08 | Stop reason: HOSPADM

## 2024-05-08 RX ORDER — LIDOCAINE HYDROCHLORIDE 20 MG/ML
INJECTION, SOLUTION INFILTRATION; PERINEURAL AS NEEDED
Status: DISCONTINUED | OUTPATIENT
Start: 2024-05-08 | End: 2024-05-08 | Stop reason: SURG

## 2024-05-08 RX ORDER — PROPOFOL 10 MG/ML
VIAL (ML) INTRAVENOUS AS NEEDED
Status: DISCONTINUED | OUTPATIENT
Start: 2024-05-08 | End: 2024-05-08 | Stop reason: SURG

## 2024-05-08 RX ORDER — LIDOCAINE HYDROCHLORIDE 10 MG/ML
0.5 INJECTION, SOLUTION INFILTRATION; PERINEURAL ONCE AS NEEDED
Status: DISCONTINUED | OUTPATIENT
Start: 2024-05-08 | End: 2024-05-08 | Stop reason: HOSPADM

## 2024-05-08 RX ORDER — SODIUM CHLORIDE 0.9 % (FLUSH) 0.9 %
10 SYRINGE (ML) INJECTION AS NEEDED
Status: DISCONTINUED | OUTPATIENT
Start: 2024-05-08 | End: 2024-05-08 | Stop reason: HOSPADM

## 2024-05-08 RX ADMIN — PROPOFOL 160 MCG/KG/MIN: 10 INJECTION, EMULSION INTRAVENOUS at 13:52

## 2024-05-08 RX ADMIN — SODIUM CHLORIDE, POTASSIUM CHLORIDE, SODIUM LACTATE AND CALCIUM CHLORIDE 1000 ML: 600; 310; 30; 20 INJECTION, SOLUTION INTRAVENOUS at 12:28

## 2024-05-08 RX ADMIN — PROPOFOL 20 MG: 10 INJECTION, EMULSION INTRAVENOUS at 13:58

## 2024-05-08 RX ADMIN — PROPOFOL 20 MG: 10 INJECTION, EMULSION INTRAVENOUS at 14:02

## 2024-05-08 RX ADMIN — PROPOFOL 60 MG: 10 INJECTION, EMULSION INTRAVENOUS at 13:53

## 2024-05-08 RX ADMIN — LIDOCAINE HYDROCHLORIDE 60 MG: 20 INJECTION, SOLUTION INFILTRATION; PERINEURAL at 13:53

## 2024-05-08 RX ADMIN — PROPOFOL 20 MG: 10 INJECTION, EMULSION INTRAVENOUS at 14:00

## 2024-05-08 NOTE — H&P
GENERAL SURGERY HISTORY AND PHYSICAL     CHIEF COMPLAINT:    History of recurrent diverticulitis    HPI:    Sushant Lala is a 43 y.o. female here for colonoscopy to evaluate after multiple recurrent episodes of diverticulitis.     ALLERGIES:   Allergies   Allergen Reactions    Penicillins Anaphylaxis    Levaquin [Levofloxacin] Rash    Metronidazole Rash     Tolerated during admission 3/5/2024       MEDICATIONS:    Reviewed in Epic       PHYSICAL EXAM:   Constitutional: Well-developed well-nourished, no acute distress  Eyes: Conjunctiva normal, sclera nonicteric  ENMT: Hearing grossly normal, oral mucosa moist  Neck: Supple, trachea midline  Respiratory: No increased work of breathing, normal inspiratory effort  Cardiovascular: Regular rate, no peripheral edema, no jugular venous distention  Gastrointestinal: Soft, nontender  Skin:  Warm, dry, no rash on visualized skin surfaces  Musculoskeletal: Symmetric strength, normal gait  Psychiatric: Alert and oriented ×3, normal affect       ASSESSMENT/PLAN:  Sushant Lala is a 43 y.o. female here for colonoscopy after recurrent diverticulitis.     Christine Morse MD  General, Robotic and Endoscopic Surgery  [facility]    [address] [place] [address], [address]  [place], [place], [address]  P: [phone]  F: [phone]

## 2024-05-08 NOTE — DISCHARGE INSTRUCTIONS
For the next 24 hours patient needs to be with a responsible adult.    For 24 hours DO NOT drive, operate machinery, appliances, drink alcohol, make important decisions or sign legal documents.    Start with a light or bland diet if you are feeling sick to your stomach otherwise advance to regular diet as tolerated.    Follow recommendations on procedure report if provided by your doctor.    Call Dr Morse for problems [phone]    Problems may include but not limited to: large amounts of bleeding, trouble breathing, repeated vomiting, severe unrelieved pain, fever or chills.

## 2024-05-08 NOTE — OP NOTE
PREOPERATIVE DIAGNOSIS:  History of recurrent diverticulitis    POSTOPERATIVE DIAGNOSIS AND FINDINGS:  Normal mucosa  Diverticulosis with scarring of sigmoid colon     PROCEDURE:  Colonoscopy to cecum     SURGEON:  Harriet Baez MD    ANESTHESIA:  MAC    SPECIMEN(S):  none    DESCRIPTION:  In the decubitus position, a digital rectal exam was performed and was normal. The colonoscope was inserted under direct visualization of the lumen to the cecum, which was confirmed by visualization of the ileocecal valve and appendiceal orifice. The scope was then slowly withdrawn circumferentially examining all mucosal surfaces. There were no mucosal abnormalities. Scattered diverticulosis seen. The sigmoid colon was difficult to traverse due to scarring and narrowing from diverticulitis. The quality of the bowel preparation was good. The patient tolerated the procedure well.    RECOMMENDATION FOR FUTURE SURVEILLANCE:  10 years    HARRIET BAEZ MD  General, Robotic and Endoscopic Surgery  [facility]    [address] [place] [address], [address]  [place], [place], [address]  P: [phone]  F: [phone]

## 2024-05-15 ENCOUNTER — OFFICE VISIT (OUTPATIENT)
Dept: SURGERY | Facility: CLINIC | Age: 44
End: 2024-05-15
Payer: COMMERCIAL

## 2024-05-15 ENCOUNTER — PREP FOR SURGERY (OUTPATIENT)
Dept: OTHER | Facility: HOSPITAL | Age: 44
End: 2024-05-15
Payer: COMMERCIAL

## 2024-05-15 VITALS
BODY MASS INDEX: 30.91 KG/M2 | DIASTOLIC BLOOD PRESSURE: 80 MMHG | HEIGHT: 62 IN | SYSTOLIC BLOOD PRESSURE: 135 MMHG | WEIGHT: 168 LBS

## 2024-05-15 DIAGNOSIS — K57.92 DIVERTICULITIS: Primary | ICD-10-CM

## 2024-05-15 PROCEDURE — 99213 OFFICE O/P EST LOW 20 MIN: CPT | Performed by: SURGERY

## 2024-05-15 RX ORDER — ERYTHROMYCIN 500 MG/1
500 TABLET, COATED ORAL 3 TIMES DAILY
Qty: 6 TABLET | Refills: 0 | Status: SHIPPED | OUTPATIENT
Start: 2024-05-15

## 2024-05-15 RX ORDER — METRONIDAZOLE 500 MG/100ML
500 INJECTION, SOLUTION INTRAVENOUS ONCE
OUTPATIENT
Start: 2024-05-15 | End: 2024-05-15

## 2024-05-15 RX ORDER — SCOLOPAMINE TRANSDERMAL SYSTEM 1 MG/1
1 PATCH, EXTENDED RELEASE TRANSDERMAL CONTINUOUS
OUTPATIENT
Start: 2024-05-15 | End: 2024-05-18

## 2024-05-15 RX ORDER — HEPARIN SODIUM 5000 [USP'U]/ML
5000 INJECTION, SOLUTION INTRAVENOUS; SUBCUTANEOUS ONCE
OUTPATIENT
Start: 2024-05-15 | End: 2024-05-15

## 2024-05-15 RX ORDER — NEOMYCIN SULFATE 500 MG/1
1000 TABLET ORAL 3 TIMES DAILY
Qty: 6 TABLET | Refills: 0 | Status: SHIPPED | OUTPATIENT
Start: 2024-05-15 | End: 2024-05-16

## 2024-05-15 RX ORDER — NEOMYCIN SULFATE 500 MG/1
1000 TABLET ORAL 3 TIMES DAILY
Qty: 6 TABLET | Refills: 0 | Status: SHIPPED | OUTPATIENT
Start: 2024-05-15 | End: 2024-05-15

## 2024-05-15 RX ORDER — ERYTHROMYCIN 500 MG/1
500 TABLET, COATED ORAL 3 TIMES DAILY
Qty: 6 TABLET | Refills: 0 | Status: SHIPPED | OUTPATIENT
Start: 2024-05-15 | End: 2024-05-15

## 2024-05-15 RX ORDER — ONDANSETRON 4 MG/1
TABLET, FILM COATED ORAL
COMMUNITY
Start: 2024-05-07

## 2024-05-15 RX ORDER — ALVIMOPAN 12 MG/1
12 CAPSULE ORAL ONCE
OUTPATIENT
Start: 2024-05-15 | End: 2024-05-15

## 2024-05-15 NOTE — PROGRESS NOTES
General Surgery Return Office Visit    Referring Provider: No ref. provider found    Chief Complaint:    Follow-up to discuss sigmoidectomy    History of Present Illness:    Sushant Lala is a 43 y.o. female with multiple recurrent episodes of diverticulitis now status post colonoscopy that was normal other than diverticulosis with evidence of thickening of the sigmoid colon.  She has not had further symptoms of diverticulitis.  She is here today to discuss and schedule sigmoidectomy.    Past Medical History:   Allergic rhinitis  Anxiety  Diverticulitis  History of uterine mass (12 years old)    Past Surgical History:    Adenoidectomy  Colonoscopy  Tonsillectomy  Myringotomy with tubes    Family History:    Mother-breast cancer, hypertension, diabetes, asthma, COPD  Father-diabetes  Maternal grandmother-breast cancer  Paternal grandmother-kidney failure  No family history of colon cancer    Social History:      Reports tobacco use, smokes 0.25 pack per day  Occasional alcohol use  Occasional marijuana use    Allergies:   Allergies   Allergen Reactions    Penicillins Anaphylaxis     Beta lactam allergy details  Antibiotic reaction: other (anaphylaxis)  Age at reaction: child  Dose to reaction time: (!) minutes  Reason for antibiotic: other  Epinephrine required for reaction?: unknown  Tolerated antibiotics: unknown       Levaquin [Levofloxacin] Rash    Metronidazole Rash     Tolerated during admission 3/5/2024       Medications:     Current Outpatient Medications:     acetaminophen (TYLENOL) 650 MG 8 hr tablet, Take 1 tablet by mouth Every 8 (Eight) Hours As Needed., Disp: , Rfl:     erythromycin base (E-MYCIN) 500 MG tablet, Take 1 tablet by mouth 3 (Three) Times a Day. Take 2 tablets at 2:00 PM, 3:00 PM, and 10:00 PM the day before surgery., Disp: 6 tablet, Rfl: 0    neomycin (MYCIFRADIN) 500 MG tablet, Take 2 tablets by mouth 3 (Three) Times a Day for 3 doses. Take 2 tablets at 2:00 PM, 3:00 PM, and  10:00 PM the day before surgery., Disp: 6 tablet, Rfl: 0    ondansetron (ZOFRAN) 4 MG tablet, , Disp: , Rfl:     sertraline (ZOLOFT) 100 MG tablet, Take 1 tablet by mouth Daily., Disp: , Rfl:     traZODone (DESYREL) 50 MG tablet, Take 1 tablet by mouth Every Night., Disp: , Rfl:     No new tests to review    Review of Systems:   Influenza-like illness: no fever, no  cough, no  sore throat, no  body aches, no loss of sense of taste or smell, no known exposure to person with Covid-19.  Constitutional: Negative for fevers or chills  HENT: Negative for hearing loss or runny nose  Eyes: Negative for vision changes or scleral icterus  Respiratory: Negative for cough or shortness of breath  Cardiovascular: Negative for chest pain or heart palpitations  Gastrointestinal:  Negative for abdominal pain, nausea, vomiting, constipation, melena, or hematochezia  Genitourinary: Negative for hematuria or dysuria  Musculoskeletal: Negative for joint swelling or gait instability  Neurologic: Negative for tremors or seizures  Psychiatric: Negative for suicidal ideations or depression  All other systems reviewed and negative    Physical Exam:   Body mass index is 30.73 kg/m².  Constitutional: Well-developed well-nourished, no acute distress  Eyes: Conjunctiva normal, sclera nonicteric  ENMT: Hearing grossly normal, oral mucosa moist  Neck: Supple, trachea midline  Respiratory: No increased work of breathing, normal inspiratory effort  Cardiovascular: Regular rate, no peripheral edema, no jugular venous distention  Gastrointestinal: Soft, nontender  Skin:  Warm, dry, no rash on visualized skin surfaces  Musculoskeletal: Symmetric strength, normal gait  Psychiatric: Alert and oriented ×3, normal affect          Assessment/Plan:  1.  Recurrent diverticulitis    Today we reviewed in depth the recommendation to proceed with sigmoidectomy since she has at least 1 episode of diverticulitis requiring hospitalization per year.  Using visual  aids, I described the procedure in detail (da Scott assisted laparoscopic sigmoidectomy with possible diverting loop ileostomy).  I answered all of the patient and her mother's questions. All risks (including bleeding, infection, damage to surrounding structures, anastomotic leak), benefits, and alternatives were explained to the patient who agreed and wished to proceed.  Also explained the expected postoperative course and recovery.     The patient does still smoke about 1/4 pack/day.  I asked her to quit completely for at least 2 weeks preoperatively to further reduce her risk of anastomotic leak.  I emphasized the importance of this and she verbalized understanding.      HARRIET BAEZ M.D.  General, Robotic, and Endoscopic Surgery  [facility]    [address], [address]  [place], [place], [address]  P: [phone]  F: [phone]

## 2024-06-13 ENCOUNTER — PRE-ADMISSION TESTING (OUTPATIENT)
Dept: PREADMISSION TESTING | Facility: HOSPITAL | Age: 44
End: 2024-06-13
Payer: COMMERCIAL

## 2024-06-13 VITALS
WEIGHT: 162.2 LBS | HEIGHT: 65 IN | BODY MASS INDEX: 27.02 KG/M2 | TEMPERATURE: 98.5 F | DIASTOLIC BLOOD PRESSURE: 82 MMHG | RESPIRATION RATE: 18 BRPM | HEART RATE: 101 BPM | SYSTOLIC BLOOD PRESSURE: 130 MMHG | OXYGEN SATURATION: 96 %

## 2024-06-13 DIAGNOSIS — K57.92 DIVERTICULITIS: ICD-10-CM

## 2024-06-13 LAB
ABO GROUP BLD: NORMAL
ANION GAP SERPL CALCULATED.3IONS-SCNC: 12.9 MMOL/L (ref 5–15)
BLD GP AB SCN SERPL QL: NEGATIVE
BUN SERPL-MCNC: 7 MG/DL (ref 6–20)
BUN/CREAT SERPL: 11.3 (ref 7–25)
CALCIUM SPEC-SCNC: 9.3 MG/DL (ref 8.6–10.5)
CHLORIDE SERPL-SCNC: 102 MMOL/L (ref 98–107)
CO2 SERPL-SCNC: 21.1 MMOL/L (ref 22–29)
CREAT SERPL-MCNC: 0.62 MG/DL (ref 0.57–1)
DEPRECATED RDW RBC AUTO: 42.4 FL (ref 37–54)
EGFRCR SERPLBLD CKD-EPI 2021: 113.5 ML/MIN/1.73
ERYTHROCYTE [DISTWIDTH] IN BLOOD BY AUTOMATED COUNT: 12.8 % (ref 12.3–15.4)
GLUCOSE SERPL-MCNC: 138 MG/DL (ref 65–99)
HCT VFR BLD AUTO: 43 % (ref 34–46.6)
HGB BLD-MCNC: 14.2 G/DL (ref 12–15.9)
MCH RBC QN AUTO: 29.7 PG (ref 26.6–33)
MCHC RBC AUTO-ENTMCNC: 33 G/DL (ref 31.5–35.7)
MCV RBC AUTO: 90 FL (ref 79–97)
PLATELET # BLD AUTO: 505 10*3/MM3 (ref 140–450)
PMV BLD AUTO: 9.9 FL (ref 6–12)
POTASSIUM SERPL-SCNC: 3.5 MMOL/L (ref 3.5–5.2)
QT INTERVAL: 389 MS
QTC INTERVAL: 432 MS
RBC # BLD AUTO: 4.78 10*6/MM3 (ref 3.77–5.28)
RH BLD: POSITIVE
SODIUM SERPL-SCNC: 136 MMOL/L (ref 136–145)
T&S EXPIRATION DATE: NORMAL
WBC NRBC COR # BLD AUTO: 15.37 10*3/MM3 (ref 3.4–10.8)

## 2024-06-13 PROCEDURE — 93010 ELECTROCARDIOGRAM REPORT: CPT | Performed by: INTERNAL MEDICINE

## 2024-06-13 PROCEDURE — 80048 BASIC METABOLIC PNL TOTAL CA: CPT

## 2024-06-13 PROCEDURE — 36415 COLL VENOUS BLD VENIPUNCTURE: CPT

## 2024-06-13 PROCEDURE — 86850 RBC ANTIBODY SCREEN: CPT

## 2024-06-13 PROCEDURE — 86900 BLOOD TYPING SEROLOGIC ABO: CPT

## 2024-06-13 PROCEDURE — 85027 COMPLETE CBC AUTOMATED: CPT

## 2024-06-13 PROCEDURE — 93005 ELECTROCARDIOGRAM TRACING: CPT

## 2024-06-13 PROCEDURE — 86901 BLOOD TYPING SEROLOGIC RH(D): CPT

## 2024-06-13 RX ORDER — DEXTROMETHORPHAN HYDROBROMIDE AND PROMETHAZINE HYDROCHLORIDE 15; 6.25 MG/5ML; MG/5ML
SYRUP ORAL 4 TIMES DAILY PRN
COMMUNITY
Start: 2024-05-29 | End: 2024-06-22

## 2024-06-13 NOTE — DISCHARGE INSTRUCTIONS
Take the following medications the morning of surgery:  SERTRALINE      If you are on prescription narcotic pain medication to control your pain you may also take that medication the morning of surgery.    General Instructions:    Follow your surgeons instructions regarding when to stop solid foods and when to stop liquids.   Verify with your surgeon if you are to complete a bowel prep and when to do so.  Patients who avoid smoking, chewing tobacco and alcohol for 4 weeks prior to surgery have a reduced risk of post-operative complications.  Quit smoking as many days before surgery as you can.  Do not smoke, use chewing tobacco or drink alcohol the day of surgery.   If applicable bring your C-PAP/ BI-PAP machine in with you to preop day of surgery.  Bring any papers given to you in the doctor’s office.  Wear clean comfortable clothes.  Do not wear contact lenses, false eyelashes or make-up.  Bring a case for your glasses.   Bring crutches or walker if applicable.  Remove all piercings.  Leave jewelry and any other valuables at home.  Hair extensions with metal clips must be removed prior to surgery.  The Pre-Admission Testing nurse will instruct you to bring medications if unable to obtain an accurate list in Pre-Admission Testing.        If you were given a blood bank ID arm band remember to bring it with you the day of surgery.    Preventing a Surgical Site Infection:  For 2 to 3 days before surgery, avoid shaving with a razor because the razor can irritate skin and make it easier to develop an infection.    Any areas of open skin can increase the risk of a post-operative wound infection by allowing bacteria to enter and travel throughout the body.  Notify your surgeon if you have any skin wounds / rashes even if it is not near the expected surgical site.  The area will need assessed to determine if surgery should be delayed until it is healed.  The night prior to surgery shower using a fresh bar of anti-bacterial  soap (such as Dial) and clean washcloth.  Sleep in a clean bed with clean clothing.  Do not allow pets to sleep with you.  Shower on the morning of surgery using a fresh bar of anti-bacterial soap (such as Dial) and clean washcloth.  Dry with a clean towel and dress in clean clothing.  Ask your surgeon if you will be receiving antibiotics prior to surgery.  Make sure you, your family, and all healthcare providers clean their hands with soap and water or an alcohol based hand  before caring for you or your wound.      CHLORHEXIDINE CLOTH INSTRUCTIONS  The morning of surgery follow these instructions using the Chlorhexidine cloths you've been given.  These steps reduce bacteria on the body.  Do not use the cloths near your eyes, ears mouth, genitalia or on open wounds.  Throw the cloths away after use but do not try to flush them down a toilet.      Open and remove one cloth at a time from the package.    Leave the cloth unfolded and begin the bathing.  Massage the skin with the cloths using gentle pressure to remove bacteria.  Do not scrub harshly.   Follow the steps below with one 2% CHG cloth per area (6 total cloths).  One cloth for neck, shoulders and chest.  One cloth for both arms, hands, fingers and underarms (do underarms last).  One cloth for the abdomen followed by groin.  One cloth for right leg and foot including between the toes.  One cloth for left leg and foot including between the toes.  The last cloth is to be used for the back of the neck, back and buttocks.    Allow the CHG to air dry 3 minutes on the skin which will give it time to work and decrease the chance of irritation.  The skin may feel sticky until it is dry.  Do not rinse with water or any other liquid or you will lose the beneficial effects of the CHG.  If mild skin irritation occurs, do rinse the skin to remove the CHG.  Report this to the nurse at time of admission.  Do not apply lotions, creams, ointments, deodorants or  perfumes after using the clothes. Dress in clean clothes before coming to the hospital.      Day of surgery:  Your arrival time is approximately two hours before your scheduled surgery time.  Upon arrival, a Pre-op nurse and Anesthesiologist will review your health history, obtain vital signs, and answer questions you may have.  The only belongings needed at this time will be a list of your home medications and if applicable your C-PAP/BI-PAP machine.  A Pre-op nurse will start an IV and you may receive medication in preparation for surgery, including something to help you relax.     Please be aware that surgery does come with discomfort.  We want to make every effort to control your discomfort so please discuss any uncontrolled symptoms with your nurse.   Your doctor will most likely have prescribed pain medications.      If you are going home after surgery you will receive individualized written care instructions before being discharged.  A responsible adult must drive you to and from the hospital on the day of your surgery and stay with you for 24 hours.  Discharge prescriptions can be filled by the hospital pharmacy during regular pharmacy hours.  If you are having surgery late in the day/evening your prescription may be e-prescribed to your pharmacy.  Please verify your pharmacy hours or chose a 24 hour pharmacy to avoid not having access to your prescription because your pharmacy has closed for the day.    If you are staying overnight following surgery, you will be transported to your hospital room following the recovery period.  [facility] has all private rooms.    If you have any questions please call Pre-Admission Testing at [phone].  Deductibles and co-payments are collected on the day of service. Please be prepared to pay the required co-pay, deductible or deposit on the day of service as defined by your plan.    Call your surgeon immediately if you experience any of the following  symptoms:  Sore Throat  Shortness of Breath or difficulty breathing  Cough  Chills  Body soreness or muscle pain  Headache  Fever  New loss of taste or smell  Do not arrive for your surgery ill.  Your procedure will need to be rescheduled to another time.  You will need to call your physician before the day of surgery to avoid any unnecessary exposure to hospital staff as well as other patients.

## 2024-06-14 ENCOUNTER — TELEPHONE (OUTPATIENT)
Dept: SURGERY | Facility: CLINIC | Age: 44
End: 2024-06-14
Payer: COMMERCIAL

## 2024-06-14 RX ORDER — METRONIDAZOLE 500 MG/1
500 TABLET ORAL 3 TIMES DAILY
Qty: 21 TABLET | Refills: 0 | Status: SHIPPED | OUTPATIENT
Start: 2024-06-14 | End: 2024-06-21

## 2024-06-14 RX ORDER — LEVOFLOXACIN 750 MG/1
750 TABLET, FILM COATED ORAL DAILY
Qty: 7 TABLET | Refills: 0 | Status: SHIPPED | OUTPATIENT
Start: 2024-06-14 | End: 2024-06-21

## 2024-06-14 NOTE — TELEPHONE ENCOUNTER
Thank you. I called and spoke with patient, who is not having any symptoms of diverticulitis nor infection elsewhere. I sent in a prescription for levaquin and flagyl and instructed her to start taking if she develops any pain that feels like previous episodes of diverticulitis.

## 2024-06-14 NOTE — TELEPHONE ENCOUNTER
PAT called wanting you to be aware the patients WBC count was elevated. Patient did not indicate if she was feeling sick.

## 2024-06-20 ENCOUNTER — ANESTHESIA EVENT (OUTPATIENT)
Dept: PERIOP | Facility: HOSPITAL | Age: 44
End: 2024-06-20
Payer: COMMERCIAL

## 2024-06-20 ENCOUNTER — HOSPITAL ENCOUNTER (INPATIENT)
Facility: HOSPITAL | Age: 44
LOS: 2 days | Discharge: HOME | DRG: 331 | End: 2024-06-22
Attending: SURGERY | Admitting: SURGERY
Payer: COMMERCIAL

## 2024-06-20 ENCOUNTER — ANESTHESIA (OUTPATIENT)
Dept: PERIOP | Facility: HOSPITAL | Age: 44
End: 2024-06-20
Payer: COMMERCIAL

## 2024-06-20 DIAGNOSIS — G89.18 ACUTE POSTOPERATIVE PAIN: Primary | ICD-10-CM

## 2024-06-20 DIAGNOSIS — K57.92 DIVERTICULITIS: ICD-10-CM

## 2024-06-20 LAB
B-HCG UR QL: NEGATIVE
EXPIRATION DATE: NORMAL
GLUCOSE BLDC GLUCOMTR-MCNC: 105 MG/DL (ref 70–130)
GLUCOSE BLDC GLUCOMTR-MCNC: 124 MG/DL (ref 70–130)
INTERNAL NEGATIVE CONTROL: NEGATIVE
INTERNAL POSITIVE CONTROL: POSITIVE
Lab: NORMAL

## 2024-06-20 PROCEDURE — 25810000003 LACTATED RINGERS PER 1000 ML: Performed by: NURSE ANESTHETIST, CERTIFIED REGISTERED

## 2024-06-20 PROCEDURE — 25010000002 DEXAMETHASONE PER 1 MG: Performed by: NURSE ANESTHETIST, CERTIFIED REGISTERED

## 2024-06-20 PROCEDURE — 25010000002 CEFAZOLIN PER 500 MG: Performed by: NURSE ANESTHETIST, CERTIFIED REGISTERED

## 2024-06-20 PROCEDURE — 25810000003 LACTATED RINGERS PER 1000 ML: Performed by: SURGERY

## 2024-06-20 PROCEDURE — 8E0W4CZ ROBOTIC ASSISTED PROCEDURE OF TRUNK REGION, PERCUTANEOUS ENDOSCOPIC APPROACH: ICD-10-PCS | Performed by: SURGERY

## 2024-06-20 PROCEDURE — 25010000002 ONDANSETRON PER 1 MG: Performed by: NURSE ANESTHETIST, CERTIFIED REGISTERED

## 2024-06-20 PROCEDURE — C9290 INJ, BUPIVACAINE LIPOSOME: HCPCS | Performed by: ANESTHESIOLOGY

## 2024-06-20 PROCEDURE — 25010000002 MAGNESIUM SULFATE PER 500 MG OF MAGNESIUM: Performed by: NURSE ANESTHETIST, CERTIFIED REGISTERED

## 2024-06-20 PROCEDURE — 25010000002 PROPOFOL 10 MG/ML EMULSION: Performed by: NURSE ANESTHETIST, CERTIFIED REGISTERED

## 2024-06-20 PROCEDURE — 25810000003 SODIUM CHLORIDE PER 500 ML: Performed by: SURGERY

## 2024-06-20 PROCEDURE — 25010000002 FENTANYL CITRATE (PF) 50 MCG/ML SOLUTION: Performed by: NURSE ANESTHETIST, CERTIFIED REGISTERED

## 2024-06-20 PROCEDURE — S0260 H&P FOR SURGERY: HCPCS | Performed by: SURGERY

## 2024-06-20 PROCEDURE — 25010000002 MIDAZOLAM PER 1 MG: Performed by: ANESTHESIOLOGY

## 2024-06-20 PROCEDURE — 25010000002 INDOCYANINE GREEN 25 MG RECONSTITUTED SOLUTION: Performed by: SURGERY

## 2024-06-20 PROCEDURE — 25010000002 SUGAMMADEX 200 MG/2ML SOLUTION: Performed by: NURSE ANESTHETIST, CERTIFIED REGISTERED

## 2024-06-20 PROCEDURE — 25010000002 CEFAZOLIN PER 500 MG: Performed by: SURGERY

## 2024-06-20 PROCEDURE — 25010000002 HYDROMORPHONE PER 4 MG: Performed by: NURSE ANESTHETIST, CERTIFIED REGISTERED

## 2024-06-20 PROCEDURE — 25010000002 HEPARIN (PORCINE) PER 1000 UNITS: Performed by: SURGERY

## 2024-06-20 PROCEDURE — 25010000002 KETOROLAC TROMETHAMINE PER 15 MG: Performed by: SURGERY

## 2024-06-20 PROCEDURE — 44213 LAP MOBIL SPLENIC FL ADD-ON: CPT | Performed by: SURGERY

## 2024-06-20 PROCEDURE — 0 BUPIVACAINE LIPOSOME 1.3 % SUSPENSION: Performed by: ANESTHESIOLOGY

## 2024-06-20 PROCEDURE — 44204 LAPARO PARTIAL COLECTOMY: CPT | Performed by: SURGERY

## 2024-06-20 PROCEDURE — 44204 LAPARO PARTIAL COLECTOMY: CPT | Performed by: PHYSICIAN ASSISTANT

## 2024-06-20 PROCEDURE — 25010000002 GLYCOPYRROLATE 0.2 MG/ML SOLUTION: Performed by: NURSE ANESTHETIST, CERTIFIED REGISTERED

## 2024-06-20 PROCEDURE — 25010000002 LIDOCAINE PER 10 MG: Performed by: NURSE ANESTHETIST, CERTIFIED REGISTERED

## 2024-06-20 PROCEDURE — 82948 REAGENT STRIP/BLOOD GLUCOSE: CPT

## 2024-06-20 PROCEDURE — 88307 TISSUE EXAM BY PATHOLOGIST: CPT | Performed by: SURGERY

## 2024-06-20 PROCEDURE — 0DBN4ZZ EXCISION OF SIGMOID COLON, PERCUTANEOUS ENDOSCOPIC APPROACH: ICD-10-PCS | Performed by: SURGERY

## 2024-06-20 PROCEDURE — 44213 LAP MOBIL SPLENIC FL ADD-ON: CPT | Performed by: PHYSICIAN ASSISTANT

## 2024-06-20 PROCEDURE — 81025 URINE PREGNANCY TEST: CPT | Performed by: ANESTHESIOLOGY

## 2024-06-20 DEVICE — STAPLER 60 RELOAD BLUE
Type: IMPLANTABLE DEVICE | Site: COLON | Status: FUNCTIONAL
Brand: SUREFORM

## 2024-06-20 RX ORDER — HYDROMORPHONE HYDROCHLORIDE 1 MG/ML
0.5 INJECTION, SOLUTION INTRAMUSCULAR; INTRAVENOUS; SUBCUTANEOUS
Status: DISCONTINUED | OUTPATIENT
Start: 2024-06-20 | End: 2024-06-22 | Stop reason: HOSPADM

## 2024-06-20 RX ORDER — SODIUM CHLORIDE 9 MG/ML
INJECTION, SOLUTION INTRAVENOUS AS NEEDED
Status: DISCONTINUED | OUTPATIENT
Start: 2024-06-20 | End: 2024-06-20 | Stop reason: HOSPADM

## 2024-06-20 RX ORDER — SODIUM CHLORIDE, SODIUM LACTATE, POTASSIUM CHLORIDE, CALCIUM CHLORIDE 600; 310; 30; 20 MG/100ML; MG/100ML; MG/100ML; MG/100ML
9 INJECTION, SOLUTION INTRAVENOUS CONTINUOUS
Status: DISCONTINUED | OUTPATIENT
Start: 2024-06-20 | End: 2024-06-20

## 2024-06-20 RX ORDER — ALVIMOPAN 12 MG/1
12 CAPSULE ORAL 2 TIMES DAILY
Status: DISCONTINUED | OUTPATIENT
Start: 2024-06-21 | End: 2024-06-21

## 2024-06-20 RX ORDER — FENTANYL CITRATE 50 UG/ML
INJECTION, SOLUTION INTRAMUSCULAR; INTRAVENOUS AS NEEDED
Status: DISCONTINUED | OUTPATIENT
Start: 2024-06-20 | End: 2024-06-20 | Stop reason: SURG

## 2024-06-20 RX ORDER — BUPIVACAINE HYDROCHLORIDE AND EPINEPHRINE 2.5; 5 MG/ML; UG/ML
INJECTION, SOLUTION EPIDURAL; INFILTRATION; INTRACAUDAL; PERINEURAL
Status: COMPLETED | OUTPATIENT
Start: 2024-06-20 | End: 2024-06-20

## 2024-06-20 RX ORDER — IPRATROPIUM BROMIDE AND ALBUTEROL SULFATE 2.5; .5 MG/3ML; MG/3ML
3 SOLUTION RESPIRATORY (INHALATION) ONCE AS NEEDED
Status: DISCONTINUED | OUTPATIENT
Start: 2024-06-20 | End: 2024-06-20 | Stop reason: HOSPADM

## 2024-06-20 RX ORDER — PHENYLEPHRINE HCL IN 0.9% NACL 1 MG/10 ML
SYRINGE (ML) INTRAVENOUS AS NEEDED
Status: DISCONTINUED | OUTPATIENT
Start: 2024-06-20 | End: 2024-06-20 | Stop reason: SURG

## 2024-06-20 RX ORDER — LIDOCAINE HYDROCHLORIDE ANHYDROUS AND DEXTROSE MONOHYDRATE 5; 400 G/100ML; MG/100ML
INJECTION, SOLUTION INTRAVENOUS CONTINUOUS PRN
Status: DISCONTINUED | OUTPATIENT
Start: 2024-06-20 | End: 2024-06-20 | Stop reason: SURG

## 2024-06-20 RX ORDER — SODIUM CHLORIDE 0.9 % (FLUSH) 0.9 %
3 SYRINGE (ML) INJECTION EVERY 12 HOURS SCHEDULED
Status: DISCONTINUED | OUTPATIENT
Start: 2024-06-20 | End: 2024-06-20 | Stop reason: HOSPADM

## 2024-06-20 RX ORDER — FENTANYL CITRATE 50 UG/ML
50 INJECTION, SOLUTION INTRAMUSCULAR; INTRAVENOUS ONCE AS NEEDED
Status: DISCONTINUED | OUTPATIENT
Start: 2024-06-20 | End: 2024-06-20 | Stop reason: HOSPADM

## 2024-06-20 RX ORDER — DIPHENHYDRAMINE HCL 25 MG
25 CAPSULE ORAL EVERY 4 HOURS PRN
Status: DISCONTINUED | OUTPATIENT
Start: 2024-06-20 | End: 2024-06-20 | Stop reason: HOSPADM

## 2024-06-20 RX ORDER — DROPERIDOL 2.5 MG/ML
0.62 INJECTION, SOLUTION INTRAMUSCULAR; INTRAVENOUS
Status: DISCONTINUED | OUTPATIENT
Start: 2024-06-20 | End: 2024-06-20 | Stop reason: HOSPADM

## 2024-06-20 RX ORDER — HYDRALAZINE HYDROCHLORIDE 20 MG/ML
5 INJECTION INTRAMUSCULAR; INTRAVENOUS
Status: DISCONTINUED | OUTPATIENT
Start: 2024-06-20 | End: 2024-06-20 | Stop reason: HOSPADM

## 2024-06-20 RX ORDER — METRONIDAZOLE 500 MG/100ML
500 INJECTION, SOLUTION INTRAVENOUS ONCE
Status: COMPLETED | OUTPATIENT
Start: 2024-06-20 | End: 2024-06-20

## 2024-06-20 RX ORDER — ONDANSETRON 2 MG/ML
4 INJECTION INTRAMUSCULAR; INTRAVENOUS EVERY 6 HOURS PRN
Status: DISCONTINUED | OUTPATIENT
Start: 2024-06-20 | End: 2024-06-22 | Stop reason: HOSPADM

## 2024-06-20 RX ORDER — MIDAZOLAM HYDROCHLORIDE 1 MG/ML
1 INJECTION INTRAMUSCULAR; INTRAVENOUS
Status: COMPLETED | OUTPATIENT
Start: 2024-06-20 | End: 2024-06-20

## 2024-06-20 RX ORDER — ROCURONIUM BROMIDE 10 MG/ML
INJECTION, SOLUTION INTRAVENOUS AS NEEDED
Status: DISCONTINUED | OUTPATIENT
Start: 2024-06-20 | End: 2024-06-20 | Stop reason: SURG

## 2024-06-20 RX ORDER — SODIUM CHLORIDE, SODIUM LACTATE, POTASSIUM CHLORIDE, CALCIUM CHLORIDE 600; 310; 30; 20 MG/100ML; MG/100ML; MG/100ML; MG/100ML
INJECTION, SOLUTION INTRAVENOUS CONTINUOUS PRN
Status: DISCONTINUED | OUTPATIENT
Start: 2024-06-20 | End: 2024-06-20 | Stop reason: SURG

## 2024-06-20 RX ORDER — ENOXAPARIN SODIUM 100 MG/ML
40 INJECTION SUBCUTANEOUS DAILY
Status: DISCONTINUED | OUTPATIENT
Start: 2024-06-21 | End: 2024-06-22 | Stop reason: HOSPADM

## 2024-06-20 RX ORDER — LIDOCAINE HYDROCHLORIDE 20 MG/ML
INJECTION, SOLUTION INFILTRATION; PERINEURAL AS NEEDED
Status: DISCONTINUED | OUTPATIENT
Start: 2024-06-20 | End: 2024-06-20 | Stop reason: SURG

## 2024-06-20 RX ORDER — METOCLOPRAMIDE HYDROCHLORIDE 5 MG/ML
10 INJECTION INTRAMUSCULAR; INTRAVENOUS ONCE AS NEEDED
Status: DISCONTINUED | OUTPATIENT
Start: 2024-06-20 | End: 2024-06-20 | Stop reason: HOSPADM

## 2024-06-20 RX ORDER — NALOXONE HCL 0.4 MG/ML
0.4 VIAL (ML) INJECTION AS NEEDED
Status: DISCONTINUED | OUTPATIENT
Start: 2024-06-20 | End: 2024-06-20 | Stop reason: HOSPADM

## 2024-06-20 RX ORDER — ACETAMINOPHEN 500 MG
1000 TABLET ORAL EVERY 6 HOURS
Status: DISCONTINUED | OUTPATIENT
Start: 2024-06-20 | End: 2024-06-22 | Stop reason: HOSPADM

## 2024-06-20 RX ORDER — HEPARIN SODIUM 5000 [USP'U]/ML
5000 INJECTION, SOLUTION INTRAVENOUS; SUBCUTANEOUS ONCE
Status: COMPLETED | OUTPATIENT
Start: 2024-06-20 | End: 2024-06-20

## 2024-06-20 RX ORDER — FAMOTIDINE 10 MG/ML
20 INJECTION, SOLUTION INTRAVENOUS ONCE
Status: COMPLETED | OUTPATIENT
Start: 2024-06-20 | End: 2024-06-20

## 2024-06-20 RX ORDER — GLYCOPYRROLATE 0.2 MG/ML
INJECTION INTRAMUSCULAR; INTRAVENOUS AS NEEDED
Status: DISCONTINUED | OUTPATIENT
Start: 2024-06-20 | End: 2024-06-20 | Stop reason: SURG

## 2024-06-20 RX ORDER — HYDROMORPHONE HYDROCHLORIDE 1 MG/ML
0.25 INJECTION, SOLUTION INTRAMUSCULAR; INTRAVENOUS; SUBCUTANEOUS
Status: DISCONTINUED | OUTPATIENT
Start: 2024-06-20 | End: 2024-06-20 | Stop reason: HOSPADM

## 2024-06-20 RX ORDER — OXYCODONE HYDROCHLORIDE 5 MG/1
5 TABLET ORAL EVERY 4 HOURS PRN
Status: DISCONTINUED | OUTPATIENT
Start: 2024-06-20 | End: 2024-06-22 | Stop reason: HOSPADM

## 2024-06-20 RX ORDER — ONDANSETRON 2 MG/ML
4 INJECTION INTRAMUSCULAR; INTRAVENOUS ONCE AS NEEDED
Status: DISCONTINUED | OUTPATIENT
Start: 2024-06-20 | End: 2024-06-20 | Stop reason: HOSPADM

## 2024-06-20 RX ORDER — PROPOFOL 10 MG/ML
VIAL (ML) INTRAVENOUS AS NEEDED
Status: DISCONTINUED | OUTPATIENT
Start: 2024-06-20 | End: 2024-06-20 | Stop reason: SURG

## 2024-06-20 RX ORDER — ALVIMOPAN 12 MG/1
12 CAPSULE ORAL ONCE
Status: COMPLETED | OUTPATIENT
Start: 2024-06-20 | End: 2024-06-20

## 2024-06-20 RX ORDER — OXYCODONE HYDROCHLORIDE 5 MG/1
5 TABLET ORAL ONCE AS NEEDED
Status: DISCONTINUED | OUTPATIENT
Start: 2024-06-20 | End: 2024-06-20 | Stop reason: HOSPADM

## 2024-06-20 RX ORDER — NALOXONE HCL 0.4 MG/ML
0.4 VIAL (ML) INJECTION
Status: DISCONTINUED | OUTPATIENT
Start: 2024-06-20 | End: 2024-06-20 | Stop reason: HOSPADM

## 2024-06-20 RX ORDER — SODIUM CHLORIDE 0.9 % (FLUSH) 0.9 %
3-10 SYRINGE (ML) INJECTION AS NEEDED
Status: DISCONTINUED | OUTPATIENT
Start: 2024-06-20 | End: 2024-06-20 | Stop reason: HOSPADM

## 2024-06-20 RX ORDER — DIPHENHYDRAMINE HYDROCHLORIDE 50 MG/ML
25 INJECTION INTRAMUSCULAR; INTRAVENOUS EVERY 4 HOURS PRN
Status: DISCONTINUED | OUTPATIENT
Start: 2024-06-20 | End: 2024-06-20 | Stop reason: HOSPADM

## 2024-06-20 RX ORDER — CEFAZOLIN SODIUM 500 MG/2.2ML
INJECTION, POWDER, FOR SOLUTION INTRAMUSCULAR; INTRAVENOUS AS NEEDED
Status: DISCONTINUED | OUTPATIENT
Start: 2024-06-20 | End: 2024-06-20 | Stop reason: SURG

## 2024-06-20 RX ORDER — NALOXONE HCL 0.4 MG/ML
0.4 VIAL (ML) INJECTION
Status: DISCONTINUED | OUTPATIENT
Start: 2024-06-20 | End: 2024-06-22 | Stop reason: HOSPADM

## 2024-06-20 RX ORDER — KETOROLAC TROMETHAMINE 30 MG/ML
30 INJECTION, SOLUTION INTRAMUSCULAR; INTRAVENOUS EVERY 6 HOURS
Status: DISCONTINUED | OUTPATIENT
Start: 2024-06-20 | End: 2024-06-21

## 2024-06-20 RX ORDER — SCOLOPAMINE TRANSDERMAL SYSTEM 1 MG/1
1 PATCH, EXTENDED RELEASE TRANSDERMAL CONTINUOUS
Status: DISCONTINUED | OUTPATIENT
Start: 2024-06-20 | End: 2024-06-22 | Stop reason: HOSPADM

## 2024-06-20 RX ORDER — KETAMINE HCL IN NACL, ISO-OSM 100MG/10ML
SYRINGE (ML) INJECTION AS NEEDED
Status: DISCONTINUED | OUTPATIENT
Start: 2024-06-20 | End: 2024-06-20 | Stop reason: SURG

## 2024-06-20 RX ORDER — ULTRASOUND COUPLING MEDIUM
GEL (GRAM) TOPICAL AS NEEDED
Status: DISCONTINUED | OUTPATIENT
Start: 2024-06-20 | End: 2024-06-20 | Stop reason: HOSPADM

## 2024-06-20 RX ORDER — LABETALOL HYDROCHLORIDE 5 MG/ML
5 INJECTION, SOLUTION INTRAVENOUS
Status: DISCONTINUED | OUTPATIENT
Start: 2024-06-20 | End: 2024-06-20 | Stop reason: HOSPADM

## 2024-06-20 RX ORDER — MAGNESIUM SULFATE HEPTAHYDRATE 500 MG/ML
INJECTION, SOLUTION INTRAMUSCULAR; INTRAVENOUS AS NEEDED
Status: DISCONTINUED | OUTPATIENT
Start: 2024-06-20 | End: 2024-06-20 | Stop reason: SURG

## 2024-06-20 RX ORDER — FLUMAZENIL 0.1 MG/ML
0.2 INJECTION INTRAVENOUS AS NEEDED
Status: DISCONTINUED | OUTPATIENT
Start: 2024-06-20 | End: 2024-06-20 | Stop reason: HOSPADM

## 2024-06-20 RX ORDER — INDOCYANINE GREEN AND WATER 25 MG
KIT INJECTION AS NEEDED
Status: DISCONTINUED | OUTPATIENT
Start: 2024-06-20 | End: 2024-06-20 | Stop reason: HOSPADM

## 2024-06-20 RX ORDER — KETAMINE HCL IN NACL, ISO-OSM 100MG/10ML
10 SYRINGE (ML) INJECTION
Status: DISCONTINUED | OUTPATIENT
Start: 2024-06-20 | End: 2024-06-20 | Stop reason: HOSPADM

## 2024-06-20 RX ORDER — ONDANSETRON 4 MG/1
4 TABLET, ORALLY DISINTEGRATING ORAL EVERY 6 HOURS PRN
Status: DISCONTINUED | OUTPATIENT
Start: 2024-06-20 | End: 2024-06-22 | Stop reason: HOSPADM

## 2024-06-20 RX ORDER — ONDANSETRON 2 MG/ML
INJECTION INTRAMUSCULAR; INTRAVENOUS AS NEEDED
Status: DISCONTINUED | OUTPATIENT
Start: 2024-06-20 | End: 2024-06-20 | Stop reason: SURG

## 2024-06-20 RX ORDER — SODIUM CHLORIDE, SODIUM LACTATE, POTASSIUM CHLORIDE, CALCIUM CHLORIDE 600; 310; 30; 20 MG/100ML; MG/100ML; MG/100ML; MG/100ML
100 INJECTION, SOLUTION INTRAVENOUS CONTINUOUS
Status: DISCONTINUED | OUTPATIENT
Start: 2024-06-20 | End: 2024-06-22 | Stop reason: HOSPADM

## 2024-06-20 RX ORDER — LIDOCAINE HYDROCHLORIDE 10 MG/ML
0.5 INJECTION, SOLUTION INFILTRATION; PERINEURAL ONCE AS NEEDED
Status: DISCONTINUED | OUTPATIENT
Start: 2024-06-20 | End: 2024-06-20 | Stop reason: HOSPADM

## 2024-06-20 RX ORDER — DEXAMETHASONE SODIUM PHOSPHATE 4 MG/ML
INJECTION, SOLUTION INTRA-ARTICULAR; INTRALESIONAL; INTRAMUSCULAR; INTRAVENOUS; SOFT TISSUE AS NEEDED
Status: DISCONTINUED | OUTPATIENT
Start: 2024-06-20 | End: 2024-06-20 | Stop reason: SURG

## 2024-06-20 RX ORDER — BUPIVACAINE HYDROCHLORIDE AND EPINEPHRINE 2.5; 5 MG/ML; UG/ML
INJECTION, SOLUTION EPIDURAL; INFILTRATION; INTRACAUDAL; PERINEURAL
Status: COMPLETED
Start: 2024-06-20 | End: 2024-06-20

## 2024-06-20 RX ORDER — BUPIVACAINE HYDROCHLORIDE AND EPINEPHRINE 5; 5 MG/ML; UG/ML
INJECTION, SOLUTION PERINEURAL AS NEEDED
Status: DISCONTINUED | OUTPATIENT
Start: 2024-06-20 | End: 2024-06-20 | Stop reason: HOSPADM

## 2024-06-20 RX ADMIN — BUPIVACAINE 20 ML: 13.3 INJECTION, SUSPENSION, LIPOSOMAL INFILTRATION at 10:35

## 2024-06-20 RX ADMIN — FENTANYL CITRATE 25 MCG: 50 INJECTION, SOLUTION INTRAMUSCULAR; INTRAVENOUS at 10:39

## 2024-06-20 RX ADMIN — Medication 100 MCG: at 11:35

## 2024-06-20 RX ADMIN — KETOROLAC TROMETHAMINE 30 MG: 30 INJECTION, SOLUTION INTRAMUSCULAR at 21:40

## 2024-06-20 RX ADMIN — ROCURONIUM BROMIDE 10 MG: 10 INJECTION, SOLUTION INTRAVENOUS at 14:22

## 2024-06-20 RX ADMIN — SODIUM CHLORIDE, SODIUM LACTATE, POTASSIUM CHLORIDE, CALCIUM CHLORIDE: 600; 310; 30; 20 INJECTION, SOLUTION INTRAVENOUS at 12:53

## 2024-06-20 RX ADMIN — LIDOCAINE HYDROCHLORIDE 2 MG/KG/HR: 4 INJECTION, SOLUTION INTRAVENOUS at 10:36

## 2024-06-20 RX ADMIN — ONDANSETRON 4 MG: 2 INJECTION INTRAMUSCULAR; INTRAVENOUS at 15:33

## 2024-06-20 RX ADMIN — SODIUM CHLORIDE 2 G: 900 INJECTION INTRAVENOUS at 09:57

## 2024-06-20 RX ADMIN — GLYCOPYRROLATE 0.1 MG: 0.2 INJECTION INTRAMUSCULAR; INTRAVENOUS at 10:41

## 2024-06-20 RX ADMIN — SODIUM CHLORIDE, POTASSIUM CHLORIDE, SODIUM LACTATE AND CALCIUM CHLORIDE 100 ML/HR: 600; 310; 30; 20 INJECTION, SOLUTION INTRAVENOUS at 19:25

## 2024-06-20 RX ADMIN — LIDOCAINE HYDROCHLORIDE 40 MG: 20 INJECTION, SOLUTION INFILTRATION; PERINEURAL at 10:12

## 2024-06-20 RX ADMIN — Medication 20 MG: at 10:45

## 2024-06-20 RX ADMIN — Medication 100 MCG: at 11:18

## 2024-06-20 RX ADMIN — SUGAMMADEX 200 MG: 100 INJECTION, SOLUTION INTRAVENOUS at 15:33

## 2024-06-20 RX ADMIN — Medication 100 MCG: at 13:33

## 2024-06-20 RX ADMIN — ALVIMOPAN 12 MG: 12 CAPSULE ORAL at 09:10

## 2024-06-20 RX ADMIN — FENTANYL CITRATE 25 MCG: 50 INJECTION, SOLUTION INTRAMUSCULAR; INTRAVENOUS at 10:12

## 2024-06-20 RX ADMIN — MIDAZOLAM 1 MG: 1 INJECTION INTRAMUSCULAR; INTRAVENOUS at 09:30

## 2024-06-20 RX ADMIN — HYDROMORPHONE HYDROCHLORIDE 0.25 MG: 1 INJECTION, SOLUTION INTRAMUSCULAR; INTRAVENOUS; SUBCUTANEOUS at 18:38

## 2024-06-20 RX ADMIN — CEFAZOLIN 2 G: 225 INJECTION, POWDER, FOR SOLUTION INTRAMUSCULAR; INTRAVENOUS at 13:57

## 2024-06-20 RX ADMIN — KETOROLAC TROMETHAMINE 30 MG: 30 INJECTION, SOLUTION INTRAMUSCULAR at 16:30

## 2024-06-20 RX ADMIN — Medication 10 MG: at 11:46

## 2024-06-20 RX ADMIN — DEXAMETHASONE SODIUM PHOSPHATE 8 MG: 4 INJECTION, SOLUTION INTRA-ARTICULAR; INTRALESIONAL; INTRAMUSCULAR; INTRAVENOUS; SOFT TISSUE at 10:41

## 2024-06-20 RX ADMIN — Medication 200 MCG: at 14:53

## 2024-06-20 RX ADMIN — SODIUM CHLORIDE, POTASSIUM CHLORIDE, SODIUM LACTATE AND CALCIUM CHLORIDE: 600; 310; 30; 20 INJECTION, SOLUTION INTRAVENOUS at 12:06

## 2024-06-20 RX ADMIN — PROPOFOL 160 MG: 10 INJECTION, EMULSION INTRAVENOUS at 10:12

## 2024-06-20 RX ADMIN — Medication 200 MCG: at 11:03

## 2024-06-20 RX ADMIN — Medication 200 MCG: at 14:58

## 2024-06-20 RX ADMIN — HEPARIN SODIUM 5000 UNITS: 5000 INJECTION INTRAVENOUS; SUBCUTANEOUS at 09:11

## 2024-06-20 RX ADMIN — ROCURONIUM BROMIDE 50 MG: 10 INJECTION, SOLUTION INTRAVENOUS at 10:12

## 2024-06-20 RX ADMIN — Medication 100 MCG: at 11:53

## 2024-06-20 RX ADMIN — Medication 100 MCG: at 12:53

## 2024-06-20 RX ADMIN — MIDAZOLAM 1 MG: 1 INJECTION INTRAMUSCULAR; INTRAVENOUS at 09:23

## 2024-06-20 RX ADMIN — SODIUM CHLORIDE, POTASSIUM CHLORIDE, SODIUM LACTATE AND CALCIUM CHLORIDE: 600; 310; 30; 20 INJECTION, SOLUTION INTRAVENOUS at 10:02

## 2024-06-20 RX ADMIN — Medication 10 MG: at 14:22

## 2024-06-20 RX ADMIN — ROCURONIUM BROMIDE 10 MG: 10 INJECTION, SOLUTION INTRAVENOUS at 10:51

## 2024-06-20 RX ADMIN — SODIUM CHLORIDE, SODIUM LACTATE, POTASSIUM CHLORIDE, CALCIUM CHLORIDE: 600; 310; 30; 20 INJECTION, SOLUTION INTRAVENOUS at 10:02

## 2024-06-20 RX ADMIN — HYDROMORPHONE HYDROCHLORIDE 0.25 MG: 1 INJECTION, SOLUTION INTRAMUSCULAR; INTRAVENOUS; SUBCUTANEOUS at 17:53

## 2024-06-20 RX ADMIN — Medication 10 MG: at 12:43

## 2024-06-20 RX ADMIN — PROPOFOL 25 MCG/KG/MIN: 10 INJECTION, EMULSION INTRAVENOUS at 10:36

## 2024-06-20 RX ADMIN — INDOCYANINE GREEN AND WATER 7.5 MG: KIT at 14:10

## 2024-06-20 RX ADMIN — SCOPALAMINE 1 PATCH: 1 PATCH, EXTENDED RELEASE TRANSDERMAL at 09:13

## 2024-06-20 RX ADMIN — ROCURONIUM BROMIDE 10 MG: 10 INJECTION, SOLUTION INTRAVENOUS at 12:43

## 2024-06-20 RX ADMIN — MAGNESIUM SULFATE HEPTAHYDRATE 1 G: 500 INJECTION, SOLUTION INTRAMUSCULAR; INTRAVENOUS at 10:42

## 2024-06-20 RX ADMIN — FAMOTIDINE 20 MG: 10 INJECTION INTRAVENOUS at 09:23

## 2024-06-20 RX ADMIN — METRONIDAZOLE 500 MG: 500 INJECTION, SOLUTION INTRAVENOUS at 09:25

## 2024-06-20 RX ADMIN — ROCURONIUM BROMIDE 10 MG: 10 INJECTION, SOLUTION INTRAVENOUS at 12:03

## 2024-06-20 RX ADMIN — BUPIVACAINE HYDROCHLORIDE AND EPINEPHRINE BITARTRATE 20 ML: 2.5; .005 INJECTION, SOLUTION EPIDURAL; INFILTRATION; INTRACAUDAL; PERINEURAL at 10:35

## 2024-06-20 RX ADMIN — FENTANYL CITRATE 50 MCG: 50 INJECTION, SOLUTION INTRAMUSCULAR; INTRAVENOUS at 15:33

## 2024-06-20 NOTE — ANESTHESIA PROCEDURE NOTES
Airway  Urgency: elective    Date/Time: 6/20/2024 10:16 AM  Airway not difficult    General Information and Staff    Patient location during procedure: OR    Indications and Patient Condition  Indications for airway management: airway protection    Preoxygenated: yes  Mask difficulty assessment: 2 - vent by mask + OA or adjuvant +/- NMBA    Final Airway Details  Final airway type: endotracheal airway      Successful airway: ETT  Cuffed: yes   Successful intubation technique: direct laryngoscopy  Facilitating devices/methods: intubating stylet  Endotracheal tube insertion site: oral  Blade: Long  Blade size: 3  ETT size (mm): 7.0  Cormack-Lehane Classification: grade IIa - partial view of glottis  Placement verified by: chest auscultation and capnometry   Measured from: lips  ETT/EBT  to lips (cm): 21  Number of attempts at approach: 1  Assessment: lips, teeth, and gum same as pre-op and atraumatic intubation

## 2024-06-20 NOTE — NURSING NOTE
06/20/24 0850   Stoma Site Marking   Procedure Marking For ileostomy   Site Marked RLQ: right lower quadrant   Patient Assessment Screen rectus muscle identified;marked within patient's visual field;bony prominences avoided;waistband avoided;creases/scars avoided   How Was Patient Marked? surgical marker   Stoma Marking Comments Patient on stretcher for pre-op, was not able to get up to stand due to being under warming blanket, was able to have patient sit up and forward quite a bit to visualize creases, she had a crease on either side of umbilicus so this area was avoided   Patient Position During Marking sitting;lying

## 2024-06-20 NOTE — OP NOTE
Operative Note :  Christine Morse MD      Sushant Lala  1980    Procedure Date: 06/20/24    Pre-op Diagnosis:  Diverticulitis [K57.92]    Post-Operative Diagnosis:  Post-Op Diagnosis Codes:     * Diverticulitis [K57.92]    Procedure:   Da Scott assisted laparoscopic mobilization of splenic flexure  Da Scott assisted laparoscopic sigmoidectomy    Surgeon: Christine Morse MD    Assistant: Nelson Morris PA-C (he was responsible for suctioning, retracting, exchange of robotic instruments, insertion of rectal portion of EEA stapler, suturing of all surgical incisions, and application of sterile dressings at the completion of the case)    Anesthesia:  General (general endotracheal tube)    Estimated Blood Loss: 100ml    Specimens: Sigmoid colon    Complications: None    Indications:  This is a very pleasant 44-year-old female with multiple bouts of diverticulitis requiring hospitalization who presents for elective sigmoidectomy.  Prior to proceeding, all risks (bleeding, infection, damage to surrounding structures, anastomotic leak, need for diverting ileostomy, benefits and alternatives were discussed with the patient in detail.  She verbalized understanding and wished to proceed.    Findings: Splenic flexure mobilized including high ligation of IMV.  Stapled end-to-end colorectal anastomosis with EEA stapler.  Negative leak test.  Bilateral ureters identified and avoided throughout entire dissection.    Description of procedure:  Patient taken the operating room where she underwent general endotracheal anesthesia as well as a tap block without complication.  She was positioned split leg with a Quiroga placed sterilely.  Her abdomen was prepped and draped in the usual sterile fashion.  A timeout was performed, confirming the patient, procedure and preoperative antibiotics.  The abdomen was accessed at Forte's point with a Veress needle and pneumoperitoneum was achieved.  4 robotic trocars were placed across the  abdomen in a diagonal line from the right ASIS towards the left subcostal margin, all under direct visualization.  The right lower port was a 12 mm to accommodate the robotic stapler.  An 8 mm assist port was placed in the right upper quadrant.  The da Scott Xi robot was brought in and the arms were connected sterilely.  We passed the instruments and the abdomen under direct visualization.  The omentum was retracted into the left upper quadrant.  I inspected the sigmoid colon, which was thickened and boggy, but there was no evidence of active diverticular disease.  I began by mobilizing the splenic flexure by taking the greater omentum off of the distal portion of the transverse colon and entering the lesser sac.  The splenocolic other retroperitoneal attachments to the colon were taken down.  I continued the dissection down the lateral peritoneal attachments.  Next, we placed the patient in Trendelenburg and retracted the sigmoid colon anteriorly and superiorly.  The peritoneum was incised and the YUSUF was identified.  It was circumferentially dissected.  The left ureter was then easily identified and was well away from the YUSUF.  The YUSUF was divided with the vessel sealer, as was the adjacent vein.  I continued dissection laterally and inferiorly.  Proximal and distal division points were selected and the colon was cleared of surrounding mesentery using the vessel sealer.  A 60 mm sure form robotic stapler with a blue load was used to divide the rectum.  Once the proximal division point was completely cleared of the mesentery, I asked anesthesia to inject ICG.  The rectal stump had excellent blood flow, and the transition point in the colon was marked with the vessel sealer.  I then checked to ensure there was adequate length for the colon to reach into the pelvis without being under tension.  There was some tension as a result of the IMV, so I traced this superiorly until it is just inferior to the pancreas and  divided it with the vessel sealer.  This allowed the colon to reach without any tension whatsoever.    Next, I went back to bedside and we made a small Pfannenstiel incision and placed a wound protector.  The colon was brought up through this and divided at the previously marked division point.  The anvil of the EEA stapler was secured with 2-0 Prolene pursestring suture.  The colon was then placed back into the abdomen and we clamped off the wound protector, reinsufflated and reinserted our camera.  Next, my assistant sequentially passed the rectal sizers followed by the EEA stapler.  The spike was brought out just anterior to the staple line.  The anvil was connected and before closing ensured the colon was not twisted at all.  It was then closed and fired.  A leak test was performed and was negative.  The donuts appeared to be a healthy thickness.  I elected to place a drain in the pelvis.  The robotic trocars were all removed.  The 12 mm fascia was closed with 0 Vicryl.  The lower Pfannenstiel incision fascia was closed with 0 PDS suture.  All skin was closed with 4-0 Monocryl and dressed with Dermabond.  The drain was secured with silk suture.    All sponge, needle and's were counts were correct at the conclusion of the procedure.  The patient was extubated and transported to recovery in stable condition.      This procedure was not performed to treat colon cancer through resection        HARRIET BAEZ MD  General, Robotic, and Endoscopic Surgery  [facility]    [address], [address]  [place] [address]  P: [phone]  F: [phone]

## 2024-06-20 NOTE — ANESTHESIA POSTPROCEDURE EVALUATION
Patient: Sushant Lala    Procedure Summary       Date: 06/20/24 Room / Location:  [facility]  /  [facility]    Anesthesia Start: 1002 Anesthesia Stop: 1555    Procedure: COLON RESECTION LAPAROSCOPIC SIGMOID WITH DAVINCI ROBOT, MOBLIZATION OF SPLENIC FLEXURE (Abdomen) Diagnosis:       Diverticulitis      (Diverticulitis [K57.92])    Surgeons: Christine Morse MD Provider: Kemi Slater MD    Anesthesia Type: general ASA Status: 3            Anesthesia Type: general    Vitals  Vitals Value Taken Time   /81 06/20/24 1615   Temp 36.6 °C (97.9 °F) 06/20/24 1550   Pulse 70 06/20/24 1628   Resp 16 06/20/24 1615   SpO2 98 % 06/20/24 1628   Vitals shown include unfiled device data.        Post Anesthesia Care and Evaluation    Pain management: adequate    Airway patency: patent  Anesthetic complications: No anesthetic complications    Cardiovascular status: acceptable  Respiratory status: acceptable  Hydration status: acceptable    Comments: /81   Pulse 72   Temp 36.6 °C (97.9 °F) (Oral)   Resp 16   LMP  (LMP Unknown)   SpO2 100%

## 2024-06-20 NOTE — ANESTHESIA PROCEDURE NOTES
Peripheral Block    Pre-sedation assessment completed: 6/20/2024 10:00 AM    Patient reassessed immediately prior to procedure    Patient location during procedure: holding area  Start time: 6/20/2024 10:25 AM  Stop time: 6/20/2024 10:35 AM  Reason for block: at surgeon's request and post-op pain management  Performed by  Anesthesiologist: Kemi Slater MD  Preanesthetic Checklist  Completed: patient identified, IV checked, site marked, risks and benefits discussed, surgical consent, monitors and equipment checked, pre-op evaluation and timeout performed  Prep:  Pt Position: supine  Sterile barriers:cap, gloves, mask and sterile barriers  Prep: ChloraPrep  Patient monitoring: blood pressure monitoring, continuous pulse oximetry and EKG  Procedure    Sedation: yes  Performed under: general  Guidance:ultrasound guided    ULTRASOUND INTERPRETATION.  Using ultrasound guidance a 22 G gauge needle was placed in close proximity to the nerve, at which point, under ultrasound guidance anesthetic was injected in the area of the nerve and spread of the anesthesia was seen on ultrasound in close proximity thereto.  There were no abnormalities seen on ultrasound; a digital image was taken; and the patient tolerated the procedure with no complications. Images:still images obtained    Laterality:Bilateral  Block Type:TAP  Injection Technique:single-shot  Needle Type:echogenic  Needle Gauge:20 G  Resistance on Injection: none    Medications Used: bupivacaine liposome (EXPAREL) 1.3 % injection - Infiltration   20 mL - 6/20/2024 10:35:00 AM  bupivacaine-EPINEPHrine PF (MARCAINE w/EPI) 0.25% -[id number] injection - Injection   20 mL - 6/20/2024 10:35:00 AM      Medications  Comment:U/S guidance used for needle placement & medication dispersement.    Photo placed in chart.      Post Assessment  Injection Assessment: negative aspiration for heme, no paresthesia on injection and incremental injection  Patient  Tolerance:comfortable throughout block  Complications:no  Performed by: Kemi Slater MD

## 2024-06-20 NOTE — H&P
General Surgery H&P    CC: History of Recurrent diverticulitis    History of Present Illness:    Sushant Lala is a 43 y.o. female with multiple recurrent episodes of diverticulitis now status post colonoscopy that was normal other than diverticulosis with evidence of thickening of the sigmoid colon.  She has not had further symptoms of diverticulitis.  She is here today for sigmoidectomy.     Past Medical History:   Allergic rhinitis  Anxiety  Diverticulitis  History of uterine mass (12 years old)     Past Surgical History:    Adenoidectomy  Colonoscopy  Tonsillectomy  Myringotomy with tubes     Family History:    Mother-breast cancer, hypertension, diabetes, asthma, COPD  Father-diabetes  Maternal grandmother-breast cancer  Paternal grandmother-kidney failure  No family history of colon cancer     Social History:      Reports tobacco use, smokes 0.25 pack per day  Occasional alcohol use  Occasional marijuana use     Allergies:   Allergies         Allergies   Allergen Reactions    Penicillins Anaphylaxis       Beta lactam allergy details  Antibiotic reaction: other (anaphylaxis)  Age at reaction: child  Dose to reaction time: (!) minutes  Reason for antibiotic: other  Epinephrine required for reaction?: unknown  Tolerated antibiotics: unknown       Levaquin [Levofloxacin] Rash    Metronidazole Rash       Tolerated during admission 3/5/2024            Medications:   Reviewed in epic     No new tests to review     Review of Systems:   Influenza-like illness: no fever, no  cough, no  sore throat, no  body aches, no loss of sense of taste or smell, no known exposure to person with Covid-19.  Constitutional: Negative for fevers or chills  HENT: Negative for hearing loss or runny nose  Eyes: Negative for vision changes or scleral icterus  Respiratory: Negative for cough or shortness of breath  Cardiovascular: Negative for chest pain or heart palpitations  Gastrointestinal:  Negative for abdominal pain,  nausea, vomiting, constipation, melena, or hematochezia  Genitourinary: Negative for hematuria or dysuria  Musculoskeletal: Negative for joint swelling or gait instability  Neurologic: Negative for tremors or seizures  Psychiatric: Negative for suicidal ideations or depression  All other systems reviewed and negative     Physical Exam:   Body mass index is 27.4 kg/m².  Constitutional: Well-developed well-nourished, no acute distress  Eyes: Conjunctiva normal, sclera nonicteric  ENMT: Hearing grossly normal, oral mucosa moist  Neck: Supple, trachea midline  Respiratory: No increased work of breathing, normal inspiratory effort  Cardiovascular: Regular rate, no peripheral edema, no jugular venous distention  Gastrointestinal: Soft, nontender  Skin:  Warm, dry, no rash on visualized skin surfaces  Musculoskeletal: Symmetric strength, normal gait  Psychiatric: Alert and oriented ×3, normal affect           Assessment/Plan:  1.  Recurrent diverticulitis     We reviewed the plan (davinci assisted laparoscopic sigmoidectomy with possible diverting loop ileostomy, possible open). I answered all of her questions. All risks (including bleeding, infection, damage to surrounding structures, anastomotic leak), benefits, and alternatives were explained to the patient who agreed and wished to proceed.        HARRIET BAEZ M.D.  General, Robotic, and Endoscopic Surgery  [facility]     [address], [address]  [place], [place], [address]  P: [phone]  F: [phone]

## 2024-06-20 NOTE — ANESTHESIA PREPROCEDURE EVALUATION
Anesthesia Evaluation     Patient summary reviewed and Nursing notes reviewed   NPO Solid Status: > 8 hours             Airway   Mallampati: II  TM distance: >3 FB  Neck ROM: full  no difficulty expected  Dental - normal exam     Pulmonary - normal exam   Cardiovascular - normal exam        Neuro/Psych  (+) psychiatric history Anxiety and Depression  GI/Hepatic/Renal/Endo      ROS Comment: Diverticulitis    Musculoskeletal     Abdominal  - normal exam   Substance History      OB/GYN          Other   arthritis,                 Anesthesia Plan    ASA 3     general     (TAP blocks)  intravenous induction     Anesthetic plan, risks, benefits, and alternatives have been provided, discussed and informed consent has been obtained with: patient.    CODE STATUS:

## 2024-06-21 LAB
ANION GAP SERPL CALCULATED.3IONS-SCNC: 7.5 MMOL/L (ref 5–15)
BASOPHILS # BLD AUTO: 0.01 10*3/MM3 (ref 0–0.2)
BASOPHILS NFR BLD AUTO: 0.1 % (ref 0–1.5)
BUN SERPL-MCNC: 6 MG/DL (ref 6–20)
BUN/CREAT SERPL: 10.3 (ref 7–25)
CALCIUM SPEC-SCNC: 8 MG/DL (ref 8.6–10.5)
CHLORIDE SERPL-SCNC: 107 MMOL/L (ref 98–107)
CO2 SERPL-SCNC: 22.5 MMOL/L (ref 22–29)
CREAT SERPL-MCNC: 0.58 MG/DL (ref 0.57–1)
DEPRECATED RDW RBC AUTO: 42.3 FL (ref 37–54)
EGFRCR SERPLBLD CKD-EPI 2021: 114.6 ML/MIN/1.73
EOSINOPHIL # BLD AUTO: 0 10*3/MM3 (ref 0–0.4)
EOSINOPHIL NFR BLD AUTO: 0 % (ref 0.3–6.2)
ERYTHROCYTE [DISTWIDTH] IN BLOOD BY AUTOMATED COUNT: 12.9 % (ref 12.3–15.4)
GLUCOSE BLDC GLUCOMTR-MCNC: 112 MG/DL (ref 70–130)
GLUCOSE BLDC GLUCOMTR-MCNC: 123 MG/DL (ref 70–130)
GLUCOSE BLDC GLUCOMTR-MCNC: 124 MG/DL (ref 70–130)
GLUCOSE BLDC GLUCOMTR-MCNC: 126 MG/DL (ref 70–130)
GLUCOSE BLDC GLUCOMTR-MCNC: 137 MG/DL (ref 70–130)
GLUCOSE SERPL-MCNC: 119 MG/DL (ref 65–99)
HCT VFR BLD AUTO: 34.6 % (ref 34–46.6)
HGB BLD-MCNC: 11.4 G/DL (ref 12–15.9)
IMM GRANULOCYTES # BLD AUTO: 0.1 10*3/MM3 (ref 0–0.05)
IMM GRANULOCYTES NFR BLD AUTO: 0.9 % (ref 0–0.5)
LYMPHOCYTES # BLD AUTO: 1.12 10*3/MM3 (ref 0.7–3.1)
LYMPHOCYTES NFR BLD AUTO: 9.8 % (ref 19.6–45.3)
MCH RBC QN AUTO: 30 PG (ref 26.6–33)
MCHC RBC AUTO-ENTMCNC: 32.9 G/DL (ref 31.5–35.7)
MCV RBC AUTO: 91.1 FL (ref 79–97)
MONOCYTES # BLD AUTO: 0.54 10*3/MM3 (ref 0.1–0.9)
MONOCYTES NFR BLD AUTO: 4.7 % (ref 5–12)
NEUTROPHILS NFR BLD AUTO: 84.5 % (ref 42.7–76)
NEUTROPHILS NFR BLD AUTO: 9.63 10*3/MM3 (ref 1.7–7)
NRBC BLD AUTO-RTO: 0 /100 WBC (ref 0–0.2)
PLATELET # BLD AUTO: 274 10*3/MM3 (ref 140–450)
PMV BLD AUTO: 9.2 FL (ref 6–12)
POTASSIUM SERPL-SCNC: 3.9 MMOL/L (ref 3.5–5.2)
RBC # BLD AUTO: 3.8 10*6/MM3 (ref 3.77–5.28)
SODIUM SERPL-SCNC: 137 MMOL/L (ref 136–145)
WBC NRBC COR # BLD AUTO: 11.4 10*3/MM3 (ref 3.4–10.8)

## 2024-06-21 PROCEDURE — 99024 POSTOP FOLLOW-UP VISIT: CPT | Performed by: SURGERY

## 2024-06-21 PROCEDURE — 25810000003 LACTATED RINGERS PER 1000 ML: Performed by: SURGERY

## 2024-06-21 PROCEDURE — 25010000002 ENOXAPARIN PER 10 MG: Performed by: SURGERY

## 2024-06-21 PROCEDURE — 25010000002 KETOROLAC TROMETHAMINE PER 15 MG: Performed by: SURGERY

## 2024-06-21 PROCEDURE — 80048 BASIC METABOLIC PNL TOTAL CA: CPT | Performed by: SURGERY

## 2024-06-21 PROCEDURE — 85025 COMPLETE CBC W/AUTO DIFF WBC: CPT | Performed by: SURGERY

## 2024-06-21 PROCEDURE — 82948 REAGENT STRIP/BLOOD GLUCOSE: CPT

## 2024-06-21 RX ORDER — NAPROXEN 250 MG/1
250 TABLET ORAL
Status: DISCONTINUED | OUTPATIENT
Start: 2024-06-21 | End: 2024-06-22 | Stop reason: HOSPADM

## 2024-06-21 RX ADMIN — ACETAMINOPHEN 1000 MG: 500 TABLET ORAL at 18:55

## 2024-06-21 RX ADMIN — ENOXAPARIN SODIUM 40 MG: 100 INJECTION SUBCUTANEOUS at 11:37

## 2024-06-21 RX ADMIN — NAPROXEN SODIUM 250 MG: 250 TABLET ORAL at 18:55

## 2024-06-21 RX ADMIN — ACETAMINOPHEN 1000 MG: 500 TABLET ORAL at 06:35

## 2024-06-21 RX ADMIN — SODIUM CHLORIDE, POTASSIUM CHLORIDE, SODIUM LACTATE AND CALCIUM CHLORIDE 100 ML/HR: 600; 310; 30; 20 INJECTION, SOLUTION INTRAVENOUS at 04:58

## 2024-06-21 RX ADMIN — OXYCODONE HYDROCHLORIDE 5 MG: 5 TABLET ORAL at 01:54

## 2024-06-21 RX ADMIN — OXYCODONE HYDROCHLORIDE 5 MG: 5 TABLET ORAL at 22:23

## 2024-06-21 RX ADMIN — ACETAMINOPHEN 1000 MG: 500 TABLET ORAL at 11:37

## 2024-06-21 RX ADMIN — OXYCODONE HYDROCHLORIDE 5 MG: 5 TABLET ORAL at 17:22

## 2024-06-21 RX ADMIN — KETOROLAC TROMETHAMINE 30 MG: 30 INJECTION, SOLUTION INTRAMUSCULAR at 04:55

## 2024-06-21 RX ADMIN — OXYCODONE HYDROCHLORIDE 5 MG: 5 TABLET ORAL at 11:37

## 2024-06-21 RX ADMIN — ALVIMOPAN 12 MG: 12 CAPSULE ORAL at 11:37

## 2024-06-21 RX ADMIN — SODIUM CHLORIDE, POTASSIUM CHLORIDE, SODIUM LACTATE AND CALCIUM CHLORIDE 100 ML/HR: 600; 310; 30; 20 INJECTION, SOLUTION INTRAVENOUS at 16:41

## 2024-06-21 RX ADMIN — ACETAMINOPHEN 1000 MG: 500 TABLET ORAL at 01:53

## 2024-06-21 RX ADMIN — NAPROXEN SODIUM 250 MG: 250 TABLET ORAL at 12:43

## 2024-06-21 NOTE — PROGRESS NOTES
Continued Stay Note  [facility]     Patient Name: Sushant Lala  MRN: [id number]  Today's Date: 6/21/2024    Admit Date: 6/20/2024    Plan: Home no needs   Discharge Plan       Row Name 06/21/24 1022       Plan    Plan Home no needs    Plan Comments Introduced self and role of CCP. Patient confirmed DC plan is to return to home. Stated she is independent with ADL's and uses no DMEs. Spouse will assist as needed and will provide transportation at DC. Denies any needs/equipment.                   Discharge Codes    No documentation.                       Soumya Muller, RN

## 2024-06-21 NOTE — PROGRESS NOTES
POD 1 s/p robotic assisted laparoscopic sigmoidectomy    S: she has been pretty somnolent after getting pain medication. Has refused to ambulate thus far. Tolerating clears. Complaining of pain this AM.    Vitals: normal, afebrile    Labs: leukocytosis of 11 (was 15 preop). Chemistry normal.    PE:  AAO, NAD  Abdomen soft, nondistended, attp, incisions c/d/I, gurvinder serosang    Plan:  Will change toradol to naprosyn. Continue tylenol scheduled. Oxycodone prn.   will help her ambulate today.   Wean oxygen.  Entereg until BM.  Continue GURVINDER until first BM.  Lovenox for dvt ppx.    Christine Morse MD  General, Robotic and Endoscopic Surgery  [facility]     [address], [address]  [place], [place], [address]  P: [phone]  F: [phone]

## 2024-06-21 NOTE — PLAN OF CARE
Goal Outcome Evaluation:  Plan of Care Reviewed With: patient        Progress: improving  Outcome Evaluation: Abdominal lap sites and incision intact with surgical glue. Jpx1 with light colored serosangineous drainage. VS stable. Was very somulent for 1st part of shift. Refused to walk so far but up to bathroom with assist however pt unable to void. St.cath with 600cc output. Medicated with scheduled toradol and tylenol and with oxycodone. No c/o nausea.  Has only had some clear liquids so far

## 2024-06-21 NOTE — PLAN OF CARE
Goal Outcome Evaluation:              Outcome Evaluation: VSS. IV fluids infusing. Up with assist to bathroom. JANET drain x 1- serosang drainage- dressing changed.Oxycodone given for pain. Sat up in chair. Ambulated in hallway. Fall precautions maintaine . Abd sites with glue- one lap site 2x2 gauze applied.

## 2024-06-22 VITALS
SYSTOLIC BLOOD PRESSURE: 149 MMHG | HEIGHT: 64 IN | WEIGHT: 162.2 LBS | HEART RATE: 74 BPM | BODY MASS INDEX: 27.69 KG/M2 | TEMPERATURE: 97.3 F | RESPIRATION RATE: 16 BRPM | OXYGEN SATURATION: 95 % | DIASTOLIC BLOOD PRESSURE: 98 MMHG

## 2024-06-22 LAB
GLUCOSE BLDC GLUCOMTR-MCNC: 118 MG/DL (ref 70–130)
GLUCOSE BLDC GLUCOMTR-MCNC: 130 MG/DL (ref 70–130)

## 2024-06-22 PROCEDURE — 25810000003 LACTATED RINGERS PER 1000 ML: Performed by: SURGERY

## 2024-06-22 PROCEDURE — 99024 POSTOP FOLLOW-UP VISIT: CPT | Performed by: STUDENT IN AN ORGANIZED HEALTH CARE EDUCATION/TRAINING PROGRAM

## 2024-06-22 PROCEDURE — 25010000002 ENOXAPARIN PER 10 MG: Performed by: SURGERY

## 2024-06-22 PROCEDURE — 82948 REAGENT STRIP/BLOOD GLUCOSE: CPT

## 2024-06-22 RX ORDER — POLYETHYLENE GLYCOL 3350 17 G/17G
17 POWDER, FOR SOLUTION ORAL DAILY PRN
Qty: 14 PACKET | Refills: 0 | Status: SHIPPED | OUTPATIENT
Start: 2024-06-22 | End: 2024-07-06

## 2024-06-22 RX ORDER — OXYCODONE HYDROCHLORIDE 5 MG/1
5 TABLET ORAL EVERY 6 HOURS PRN
Qty: 12 TABLET | Refills: 0 | Status: SHIPPED | OUTPATIENT
Start: 2024-06-22 | End: 2024-06-26 | Stop reason: SDUPTHER

## 2024-06-22 RX ORDER — DOCUSATE SODIUM 100 MG/1
100 CAPSULE, LIQUID FILLED ORAL 2 TIMES DAILY
Qty: 28 CAPSULE | Refills: 0 | Status: SHIPPED | OUTPATIENT
Start: 2024-06-22 | End: 2024-07-06

## 2024-06-22 RX ORDER — ONDANSETRON 4 MG/1
4 TABLET, ORALLY DISINTEGRATING ORAL EVERY 6 HOURS PRN
Qty: 9 TABLET | Refills: 0 | Status: SHIPPED | OUTPATIENT
Start: 2024-06-22 | End: 2024-06-25

## 2024-06-22 RX ADMIN — ACETAMINOPHEN 1000 MG: 500 TABLET ORAL at 07:03

## 2024-06-22 RX ADMIN — OXYCODONE HYDROCHLORIDE 5 MG: 5 TABLET ORAL at 09:19

## 2024-06-22 RX ADMIN — ACETAMINOPHEN 1000 MG: 500 TABLET ORAL at 00:26

## 2024-06-22 RX ADMIN — OXYCODONE HYDROCHLORIDE 5 MG: 5 TABLET ORAL at 13:19

## 2024-06-22 RX ADMIN — OXYCODONE HYDROCHLORIDE 5 MG: 5 TABLET ORAL at 04:44

## 2024-06-22 RX ADMIN — ENOXAPARIN SODIUM 40 MG: 100 INJECTION SUBCUTANEOUS at 08:38

## 2024-06-22 RX ADMIN — ACETAMINOPHEN 1000 MG: 500 TABLET ORAL at 12:35

## 2024-06-22 RX ADMIN — NAPROXEN SODIUM 250 MG: 250 TABLET ORAL at 08:39

## 2024-06-22 RX ADMIN — NAPROXEN SODIUM 250 MG: 250 TABLET ORAL at 14:21

## 2024-06-22 RX ADMIN — SODIUM CHLORIDE, POTASSIUM CHLORIDE, SODIUM LACTATE AND CALCIUM CHLORIDE 100 ML/HR: 600; 310; 30; 20 INJECTION, SOLUTION INTRAVENOUS at 01:51

## 2024-06-22 NOTE — NURSING NOTE
Pt and  present during discharge teaching. Pt instructed to call and make an appointment with MD. Phone number provided. Pt instructed to  her medications from her preferred pharmacy. All belongings with pt. All questions answered. Pt ready to go home.

## 2024-06-22 NOTE — PROGRESS NOTES
Postop day 2 robotic sigmoid colectomy for diverticulitis by Dr. Morse    S: Patient is feeling okay today.  She reports a lot of pain at her extraction site and her incisions.  She denies any nausea or vomiting and has been tolerating regular diet.  She had 4 loose watery bowel movements without blood in them.  She has been up out of bed.    O: On exam she is AVSS.  Her abdominal exam is acceptable postoperatively, she has mild distention and appropriate tenderness around her incisions which are clean, dry, intact, skin glue in place, no concern for infection.  Her Wang drain has serosanguineous fluid in the bulb, output 20 cc recorded. No labs to review today.    A/P:   44-year-old lady status post robotic sigmoid colectomy for diverticulitis  Continue pain control.  Mobilization.  Discontinue drain and likely discharge this afternoon versus tomorrow depending on her pain control.    Pricila Ham M.D.  General, Robotic, and Endoscopic Surgery  [facility]    [address], [address]  [place], [place], [address]  P: [phone]  F: [phone]

## 2024-06-22 NOTE — DISCHARGE SUMMARY
"General Surgery Discharge Summary    Patient name:   Sushant Lala    Medical record number:   [id number]    Admission date:   6/20/2024    Discharge date:    6/22/2024    Attending physician:   Pricila Ham MD    Primary care physician:   Alissa Bourgeois MD    Referring physician:   Christine Morse MD  [address] [address]    Consulting physician(s):   None    Condition on discharge:   stable    Diagnosis:  Diverticulitis      Operative Procedure:   Robotic assisted laparoscopic sigmoid colectomy  Robotic assisted laparoscopic mobilization of splenic flexure    Hospital Course:   The patient is a 44 y.o. female who was admitted to the hospital with history of diverticulitis.  She underwent robotic assisted laparoscopic mobilization of splenic flexure and sigmoid colectomy by Dr. Morse on 6/20/2024.  Postoperatively was admitted to the floor where she recovered uneventfully.  Today she is having bowel movements, tolerating regular diet, having no nausea or vomiting, and ambulating.  Her drain has serous fluid in it, her incisions are healing in good order.  Patient is eager to go home, stating \"the sooner, the better.\"    Physical exam:  Patient AVSS.  Abdominal exam is appropriately tender around her incisions which are clean, dry, intact, with skin glue in place.  Wang drain has small amount of serous fluid in the bulb.  There is no sign of infection.  The abdomen is mildly distended and there is no rebound or guarding.  Patient is ambulatory during exam.     Patient was counseled about postoperative expectations and signs and symptoms concerning for need to call the office or return to the ER.  Discharge instructions provided as well in discharge summary.    Discharge medications:      Discharge Medications        New Medications        Instructions Start Date   docusate sodium 100 MG capsule  Commonly known as: Colace   100 mg, Oral, 2 Times Daily      ondansetron " ODT 4 MG disintegrating tablet  Commonly known as: ZOFRAN-ODT   4 mg, Oral, Every 6 Hours PRN      oxyCODONE 5 MG immediate release tablet  Commonly known as: ROXICODONE   5 mg, Oral, Every 6 Hours PRN      polyethylene glycol 17 g packet  Commonly known as: MIRALAX   17 g, Oral, Daily PRN             Continue These Medications        Instructions Start Date   acetaminophen 650 MG 8 hr tablet  Commonly known as: TYLENOL   650 mg, Oral, Every 8 Hours PRN      sertraline 100 MG tablet  Commonly known as: ZOLOFT   100 mg, Oral, Daily      traZODone 50 MG tablet  Commonly known as: DESYREL   50 mg, Oral, Nightly             Stop These Medications      erythromycin base 500 MG tablet  Commonly known as: E-MYCIN     levoFLOXacin 750 MG tablet  Commonly known as: Levaquin     metroNIDAZOLE 500 MG tablet  Commonly known as: Flagyl     ondansetron 4 MG tablet  Commonly known as: ZOFRAN     promethazine-dextromethorphan 6.25-15 MG/5ML syrup  Commonly known as: PROMETHAZINE-DM              Discharge instructions:    COLON or SMALL BOWEL RESECTION  POST OP RECOMMENDATIONS  Dr. Christine Morse  [phone]  ACTIVITIES:  Expect to rest most of the first week after discharge from hospital, but do get up several times daily to reduce the risk of getting a clot in your legs.  No strenuous activity or lifting over  20 lbs. for six weeks.   No driving while taking narcotic pain medication. You must be off pain meds 24 hours before driving after that visit unless otherwise instructed.  You can climb stairs, and walking is encouraged.  SYMPTOMS:  Avoiding constipation is important after this surgery as it is common when taking pain medication.  Over the counter laxatives, such as Miralax, can be used temporarily to avoid this.   Fatigue and decreased stamina is not unusual for about a week or so after surgery due to anesthesia and the surgery itself.  Try to take walks with some mild activity between resting.  Shoulder pain is not  unusual from the “gas” used in laparoscopy which will dissipate within 1-3 days.  If it does not, call your physician.  It is not unusual for your gastrointestinal system to take 1-2 months to get back to your normal routine and you may have long term changes towards looser bowel movements  WOUND SITE:  Dressings may occasionally have spots of blood on them.  As long as it is dry, these do not need to be changed.  If it is soaked, then the dressing should be removed. It can be recovered with a dry bandage such as a gauze pad or band-aid.  Skin irritation, redness or itching can prompt removal of the bandage earlier if present.  Steri-strips are to be left in place until they fall off on their own in 1-2 weeks.  If they are irritating then they may be removed sooner.  Okay to shower starting 24 hours after surgery.  Do not scrub your incisions, but let soap and water run over them and the steri-strips.  If the steri-strips fall off on their own.  You do not have to replace them.  MEALS:  A soft diet is recommended for the first 1-2 days after discharge from the hospital.  A normal diet may then be started as tolerated after that. Follow dietary guidelines when specified  Expect to eat less after this procedure and fill up somewhat faster.   Do NOT take pain pills on an empty stomach.  WORK:  In general if you have a sedentary job, you can return to work in 3 weeks if done laparoscopically.  If lifting or exertion is required it may be 3-6 weeks depending on your recovery.    Use discretion and remember that your stamina will be decreased post operatively.  Return to work notes can be provided at the time of your post-operative appointment.  FOLLOW UP:  Call and make a post-operative appointment for approximately 7 days after the procedure  Please call me for any questions or concerns not addressed above.        Follow-up appointment:   Follow up with Dr. Morse in the office in 2 weeks. Call for appointment at  [phone].        Pricila Ham M.D.  General, Robotic, and Endoscopic Surgery  [facility]    [address], [address]  [place], [place], [address]  P: [phone]  F: [phone]

## 2024-06-22 NOTE — DISCHARGE INSTRUCTIONS
COLON or SMALL BOWEL RESECTION  POST OP RECOMMENDATIONS  Dr. Christine Morse  [phone]  ACTIVITIES:  Expect to rest most of the first week after discharge from hospital, but do get up several times daily to reduce the risk of getting a clot in your legs.  No strenuous activity or lifting over  20 lbs. for six weeks.   No driving while taking narcotic pain medication. You must be off pain meds 24 hours before driving after that visit unless otherwise instructed.  You can climb stairs, and walking is encouraged.  SYMPTOMS:  Avoiding constipation is important after this surgery as it is common when taking pain medication.  Over the counter laxatives, such as Miralax, can be used temporarily to avoid this.   Fatigue and decreased stamina is not unusual for about a week or so after surgery due to anesthesia and the surgery itself.  Try to take walks with some mild activity between resting.  Shoulder pain is not unusual from the “gas” used in laparoscopy which will dissipate within 1-3 days.  If it does not, call your physician.  It is not unusual for your gastrointestinal system to take 1-2 months to get back to your normal routine and you may have long term changes towards looser bowel movements  WOUND SITE:  Dressings may occasionally have spots of blood on them.  As long as it is dry, these do not need to be changed.  If it is soaked, then the dressing should be removed. It can be recovered with a dry bandage such as a gauze pad or band-aid.  Skin irritation, redness or itching can prompt removal of the bandage earlier if present.  Steri-strips are to be left in place until they fall off on their own in 1-2 weeks.  If they are irritating then they may be removed sooner.  Okay to shower starting 24 hours after surgery.  Do not scrub your incisions, but let soap and water run over them and the steri-strips.  If the steri-strips fall off on their own.  You do not have to replace them.  MEALS:  A soft diet is  recommended for the first 1-2 days after discharge from the hospital.  A normal diet may then be started as tolerated after that. Follow dietary guidelines when specified  Expect to eat less after this procedure and fill up somewhat faster.   Do NOT take pain pills on an empty stomach.  WORK:  In general if you have a sedentary job, you can return to work in 3 weeks if done laparoscopically.  If lifting or exertion is required it may be 3-6 weeks depending on your recovery.    Use discretion and remember that your stamina will be decreased post operatively.  Return to work notes can be provided at the time of your post-operative appointment.  FOLLOW UP:  Call and make a post-operative appointment for approximately 7 days after the procedure  Please call me for any questions or concerns not addressed above.

## 2024-06-22 NOTE — CASE MANAGEMENT/SOCIAL WORK
Case Management Discharge Note      Final Note: home         Selected Continued Care - Discharged on 6/22/2024 Admission date: 6/20/2024 - Discharge disposition: Home or Self Care      Destination    No services have been selected for the patient.                Durable Medical Equipment    No services have been selected for the patient.                Dialysis/Infusion    No services have been selected for the patient.                Home Medical Care    No services have been selected for the patient.                Therapy    No services have been selected for the patient.                Community Resources    No services have been selected for the patient.                Community & DME    No services have been selected for the patient.                         Final Discharge Disposition Code: 01 - home or self-care

## 2024-06-22 NOTE — PLAN OF CARE
Goal Outcome Evaluation:  Plan of Care Reviewed With: patient        Progress: improving  Outcome Evaluation: ivf, vdg, accucheck, needs much encouragement to walk and use i/s, abd with lap sites and small incision, eras pt with scheduled meds, also taking pain pills

## 2024-06-23 NOTE — PAYOR COMM NOTE
"Mikie Monet (44 y.o. Female)          VT SUMMARY FOR [id number]          Date of Birth   1980    Social Security Number       Address   [address] [place], [place] [address]    Home Phone   [phone]    MRN   [id number]       P[employer]alism   Non-Faith    Marital Status                               Admission Date   6/20/24    Admission Type   Elective    Admitting Provider   Christine Morse MD    Attending Provider       Department, Room/Bed   [address], 91/1       Discharge Date   6/22/2024    Discharge Disposition   Home or Self Care    Discharge Destination                                 Attending Provider: (none)   Allergies: Penicillins, Levaquin [Levofloxacin], Metronidazole    Isolation: None   Infection: None   Code Status: Prior    Ht: 163.8 cm (64.49\")   Wt: 73.6 kg (162 lb 3.2 oz)    Admission Cmt: None   Principal Problem: Diverticulitis [K57.92]                   Active Insurance as of 6/20/2024       Primary Coverage       Payor Plan Insurance Group Employer/Plan Group    [facility] BLUE CROSS [facility] CROSS BLUE [facility] PPO [id number]       Payor Plan Address Payor Plan Phone Number Payor Plan Fax Number Effective Dates    [address] [phone]  6/1/2022 - None Entered    [facility] [id number]         Subscriber Name Subscriber Birth Date Member ID       NATHANIELJIMMIKIE 1980 [id number]               Secondary Coverage       Payor Plan Insurance Group Employer/Plan Group    [place]        Payor Plan Address Payor Plan Phone Number Payor Plan Fax Number Effective Dates    [address] [phone]  1/1/2024 - None Entered    [facility] [id number]         Subscriber Name Subscriber Birth Date Member ID       NATHANIELJIMMIKIE CROWLEY 1980 [id number]                     Emergency Contacts        (Rel.) Home Phone Work Phone Mobile Phone    TIARA MONET (Spouse) [phone] -- [phone]    " "Francisca Guerin (Mother) [phone] -- --                 Discharge Summary        Pricila Ham MD at 06/22/24 1105          General Surgery Discharge Summary    Patient name:   Sushant Lala    Medical record number:   [id number]    Admission date:   6/20/2024    Discharge date:    6/22/2024    Attending physician:   Pricila Ham MD    Primary care physician:   Alissa Bourgeois MD    Referring physician:   Christine Morse MD  [address] [place] [address]    Consulting physician(s):   None    Condition on discharge:   stable    Diagnosis:  Diverticulitis      Operative Procedure:   Robotic assisted laparoscopic sigmoid colectomy  Robotic assisted laparoscopic mobilization of splenic flexure    Hospital Course:   The patient is a 44 y.o. female who was admitted to the hospital with history of diverticulitis.  She underwent robotic assisted laparoscopic mobilization of splenic flexure and sigmoid colectomy by Dr. Morse on 6/20/2024.  Postoperatively was admitted to the floor where she recovered uneventfully.  Today she is having bowel movements, tolerating regular diet, having no nausea or vomiting, and ambulating.  Her drain has serous fluid in it, her incisions are healing in good order.  Patient is eager to go home, stating \"the sooner, the better.\"    Physical exam:  Patient AVSS.  Abdominal exam is appropriately tender around her incisions which are clean, dry, intact, with skin glue in place.  Wang drain has small amount of serous fluid in the bulb.  There is no sign of infection.  The abdomen is mildly distended and there is no rebound or guarding.  Patient is ambulatory during exam.     Patient was counseled about postoperative expectations and signs and symptoms concerning for need to call the office or return to the ER.  Discharge instructions provided as well in discharge summary.    Discharge medications:      Discharge Medications        New Medications        " Instructions Start Date   docusate sodium 100 MG capsule  Commonly known as: Colace   100 mg, Oral, 2 Times Daily      ondansetron ODT 4 MG disintegrating tablet  Commonly known as: ZOFRAN-ODT   4 mg, Oral, Every 6 Hours PRN      oxyCODONE 5 MG immediate release tablet  Commonly known as: ROXICODONE   5 mg, Oral, Every 6 Hours PRN      polyethylene glycol 17 g packet  Commonly known as: MIRALAX   17 g, Oral, Daily PRN             Continue These Medications        Instructions Start Date   acetaminophen 650 MG 8 hr tablet  Commonly known as: TYLENOL   650 mg, Oral, Every 8 Hours PRN      sertraline 100 MG tablet  Commonly known as: ZOLOFT   100 mg, Oral, Daily      traZODone 50 MG tablet  Commonly known as: DESYREL   50 mg, Oral, Nightly             Stop These Medications      erythromycin base 500 MG tablet  Commonly known as: E-MYCIN     levoFLOXacin 750 MG tablet  Commonly known as: Levaquin     metroNIDAZOLE 500 MG tablet  Commonly known as: Flagyl     ondansetron 4 MG tablet  Commonly known as: ZOFRAN     promethazine-dextromethorphan 6.25-15 MG/5ML syrup  Commonly known as: PROMETHAZINE-DM              Discharge instructions:    COLON or SMALL BOWEL RESECTION  POST OP RECOMMENDATIONS  Dr. Christine Morse  [phone]  ACTIVITIES:  Expect to rest most of the first week after discharge from hospital, but do get up several times daily to reduce the risk of getting a clot in your legs.  No strenuous activity or lifting over  20 lbs. for six weeks.   No driving while taking narcotic pain medication. You must be off pain meds 24 hours before driving after that visit unless otherwise instructed.  You can climb stairs, and walking is encouraged.  SYMPTOMS:  Avoiding constipation is important after this surgery as it is common when taking pain medication.  Over the counter laxatives, such as Miralax, can be used temporarily to avoid this.   Fatigue and decreased stamina is not unusual for about a week or so after surgery  due to anesthesia and the surgery itself.  Try to take walks with some mild activity between resting.  Shoulder pain is not unusual from the “gas” used in laparoscopy which will dissipate within 1-3 days.  If it does not, call your physician.  It is not unusual for your gastrointestinal system to take 1-2 months to get back to your normal routine and you may have long term changes towards looser bowel movements  WOUND SITE:  Dressings may occasionally have spots of blood on them.  As long as it is dry, these do not need to be changed.  If it is soaked, then the dressing should be removed. It can be recovered with a dry bandage such as a gauze pad or band-aid.  Skin irritation, redness or itching can prompt removal of the bandage earlier if present.  Steri-strips are to be left in place until they fall off on their own in 1-2 weeks.  If they are irritating then they may be removed sooner.  Okay to shower starting 24 hours after surgery.  Do not scrub your incisions, but let soap and water run over them and the steri-strips.  If the steri-strips fall off on their own.  You do not have to replace them.  MEALS:  A soft diet is recommended for the first 1-2 days after discharge from the hospital.  A normal diet may then be started as tolerated after that. Follow dietary guidelines when specified  Expect to eat less after this procedure and fill up somewhat faster.   Do NOT take pain pills on an empty stomach.  WORK:  In general if you have a sedentary job, you can return to work in 3 weeks if done laparoscopically.  If lifting or exertion is required it may be 3-6 weeks depending on your recovery.    Use discretion and remember that your stamina will be decreased post operatively.  Return to work notes can be provided at the time of your post-operative appointment.  FOLLOW UP:  Call and make a post-operative appointment for approximately 7 days after the procedure  Please call me for any questions or concerns not  addressed above.        Follow-up appointment:   Follow up with Dr. Morse in the office in 2 weeks. Call for appointment at [phone].        Pricila Ham M.D.  General, Robotic, and Endoscopic Surgery  [facility]    [address], [address]  [place], [place], [address]  P: [phone]  F: [phone]           Electronically signed by Pricila Ham MD at 06/22/24 5089

## 2024-06-25 LAB
LAB AP CASE REPORT: NORMAL
LAB AP CLINICAL INFORMATION: NORMAL
PATH REPORT.FINAL DX SPEC: NORMAL
PATH REPORT.GROSS SPEC: NORMAL

## 2024-06-26 ENCOUNTER — TELEPHONE (OUTPATIENT)
Dept: SURGERY | Facility: CLINIC | Age: 44
End: 2024-06-26
Payer: COMMERCIAL

## 2024-06-26 DIAGNOSIS — G89.18 ACUTE POSTOPERATIVE PAIN: ICD-10-CM

## 2024-06-26 RX ORDER — OXYCODONE HYDROCHLORIDE 5 MG/1
5 TABLET ORAL EVERY 6 HOURS PRN
Qty: 15 TABLET | Refills: 0 | Status: SHIPPED | OUTPATIENT
Start: 2024-06-26 | End: 2024-07-03

## 2024-06-26 NOTE — TELEPHONE ENCOUNTER
"Patient's  called the triage line stating his wife continues having a lot of abdominal pain. I was able to talk to her, she stated her pain is 10/10 on her lower abdomen, that does not improve even if she is laying down. Currently she is taking just tylenol which does not help, oxycodone was able to take \"some of the edge off, but not always\", patient did not noticed real improvement with it. She has been having regular bowel movements and continues taking Miralax, colace. She did not refer any other symptom. Advised patient to continue tylenol and alternate with ibuprofen in between. Patient would like to be prescribed something for pain.     S/P Da Scott assisted laparoscopic sigmoidectomy and mobilization of splenic flexure 06/20/24  "

## 2024-06-27 NOTE — TELEPHONE ENCOUNTER
Spoke with patient she stated that she is not having any fever or chill.   Advised she needed to go have blood work. Patient voiced understanding and did not have any further questions

## 2024-06-28 ENCOUNTER — LAB (OUTPATIENT)
Dept: LAB | Facility: HOSPITAL | Age: 44
End: 2024-06-28
Payer: COMMERCIAL

## 2024-06-28 DIAGNOSIS — G89.18 ACUTE POSTOPERATIVE PAIN: ICD-10-CM

## 2024-06-28 LAB
BASOPHILS # BLD AUTO: 0.07 10*3/MM3 (ref 0–0.2)
BASOPHILS NFR BLD AUTO: 0.6 % (ref 0–1.5)
DEPRECATED RDW RBC AUTO: 42.7 FL (ref 37–54)
EOSINOPHIL # BLD AUTO: 0.38 10*3/MM3 (ref 0–0.4)
EOSINOPHIL NFR BLD AUTO: 3 % (ref 0.3–6.2)
ERYTHROCYTE [DISTWIDTH] IN BLOOD BY AUTOMATED COUNT: 13.1 % (ref 12.3–15.4)
HCT VFR BLD AUTO: 43.6 % (ref 34–46.6)
HGB BLD-MCNC: 14.5 G/DL (ref 12–15.9)
IMM GRANULOCYTES # BLD AUTO: 0.05 10*3/MM3 (ref 0–0.05)
IMM GRANULOCYTES NFR BLD AUTO: 0.4 % (ref 0–0.5)
LYMPHOCYTES # BLD AUTO: 2.79 10*3/MM3 (ref 0.7–3.1)
LYMPHOCYTES NFR BLD AUTO: 22.4 % (ref 19.6–45.3)
MCH RBC QN AUTO: 29.7 PG (ref 26.6–33)
MCHC RBC AUTO-ENTMCNC: 33.3 G/DL (ref 31.5–35.7)
MCV RBC AUTO: 89.3 FL (ref 79–97)
MONOCYTES # BLD AUTO: 0.68 10*3/MM3 (ref 0.1–0.9)
MONOCYTES NFR BLD AUTO: 5.5 % (ref 5–12)
NEUTROPHILS NFR BLD AUTO: 68.1 % (ref 42.7–76)
NEUTROPHILS NFR BLD AUTO: 8.5 10*3/MM3 (ref 1.7–7)
NRBC BLD AUTO-RTO: 0 /100 WBC (ref 0–0.2)
PLATELET # BLD AUTO: 501 10*3/MM3 (ref 140–450)
PMV BLD AUTO: 9 FL (ref 6–12)
RBC # BLD AUTO: 4.88 10*6/MM3 (ref 3.77–5.28)
WBC NRBC COR # BLD AUTO: 12.47 10*3/MM3 (ref 3.4–10.8)

## 2024-06-28 PROCEDURE — 85025 COMPLETE CBC W/AUTO DIFF WBC: CPT

## 2024-06-28 PROCEDURE — 36415 COLL VENOUS BLD VENIPUNCTURE: CPT

## 2024-06-30 ENCOUNTER — TELEPHONE (OUTPATIENT)
Dept: SURGERY | Facility: CLINIC | Age: 44
End: 2024-06-30
Payer: COMMERCIAL

## 2024-06-30 NOTE — TELEPHONE ENCOUNTER
I have attempted to call patient multiple times over the weekend to go over her lab results and see how she is feeling but there is been no answer.  White blood count is 12.47.  It was 11.4 postoperatively and 15.37 preoperatively.

## 2024-07-01 ENCOUNTER — TELEPHONE (OUTPATIENT)
Dept: SURGERY | Facility: CLINIC | Age: 44
End: 2024-07-01
Payer: COMMERCIAL

## 2024-07-01 NOTE — TELEPHONE ENCOUNTER
Patient's  called in asking about her labs. I advised that you had tried to call over the weekend with no answer. He is requesting a call back to discuss patient's labs.

## 2024-07-01 NOTE — TELEPHONE ENCOUNTER
I called and spoke with patient and her .  States her pain seems to be getting better.  She still has some mild nausea.  She is having regular bowel function and has not felt febrile at all.  I explained her labs and that her white count is elevated but it is less than her preoperative labs and similar to her postop white blood count.  I explained that if she were to have any high fevers or worsening pain she should go to the ER [facility], but I would be happy to see her in office tomorrow and determine if we need to get a CT scan at that time.    [facility] - Can you change her follow up appointment to tomorrow at 9:45? Her current appointment isnt until 7/9.

## 2024-07-02 ENCOUNTER — HOSPITAL ENCOUNTER (OUTPATIENT)
Dept: PET IMAGING | Facility: HOSPITAL | Age: 44
Discharge: HOME | End: 2024-07-02
Admitting: SURGERY
Payer: COMMERCIAL

## 2024-07-02 ENCOUNTER — OFFICE VISIT (OUTPATIENT)
Dept: SURGERY | Facility: CLINIC | Age: 44
End: 2024-07-02
Payer: COMMERCIAL

## 2024-07-02 VITALS
SYSTOLIC BLOOD PRESSURE: 120 MMHG | HEIGHT: 64 IN | DIASTOLIC BLOOD PRESSURE: 82 MMHG | BODY MASS INDEX: 27.04 KG/M2 | WEIGHT: 158.4 LBS

## 2024-07-02 DIAGNOSIS — Z90.49 S/P LAPAROSCOPIC-ASSISTED SIGMOIDECTOMY: ICD-10-CM

## 2024-07-02 DIAGNOSIS — K57.92 DIVERTICULITIS: Primary | ICD-10-CM

## 2024-07-02 DIAGNOSIS — K57.92 DIVERTICULITIS: ICD-10-CM

## 2024-07-02 DIAGNOSIS — G89.18 ACUTE POSTOPERATIVE PAIN: ICD-10-CM

## 2024-07-02 PROCEDURE — 25510000001 IOPAMIDOL 61 % SOLUTION: Performed by: SURGERY

## 2024-07-02 PROCEDURE — 99024 POSTOP FOLLOW-UP VISIT: CPT | Performed by: SURGERY

## 2024-07-02 PROCEDURE — 74177 CT ABD & PELVIS W/CONTRAST: CPT

## 2024-07-02 RX ORDER — OXYCODONE HYDROCHLORIDE 5 MG/1
5 TABLET ORAL EVERY 6 HOURS PRN
Qty: 15 TABLET | Refills: 0 | Status: SHIPPED | OUTPATIENT
Start: 2024-07-02 | End: 2024-07-09

## 2024-07-02 RX ADMIN — IOPAMIDOL 85 ML: 612 INJECTION, SOLUTION INTRAVENOUS at 10:49

## 2024-07-02 NOTE — PROGRESS NOTES
General Surgery Post-Operative Follow Up Note     History of Present Illness:    Sushant Lala is a 44 y.o. year old female who presents for post-operative follow up from davinci assisted laparoscopic sigmoidectomy for chronic/recurrent diverticulitis. She was still having pain last week and I ordered a CBC, which showed WBC 12 (15 preop and 11 postop). She was not having any fevers or chills. No nausea or vomiting (although she reported one episode this morning but states this is more emotional). They also have had a stressful few days as her father in law passed away and the  was this weekend.  Patient also admits to smoking cigarettes.    Procedure:    Davinci assisted laparoscopic sigmoidectomy    Pathology:    1. Sigmoid colon, resection:               A. Portion of colon with multiple diverticuli.               B. Surgical margins of resection appear viable.               C. 6 benign lymph nodes.    Physical Exam:   Constitutional: Well-developed well-nourished, no acute distress  Eyes: Conjunctiva normal, sclera nonicteric  ENMT: Hearing grossly normal, oral mucosa moist  Respiratory: No increased work of breathing, normal inspiratory effort  Cardiovascular: Regular rate, no peripheral edema, no jugular venous distention  Gastrointestinal: Soft, mildly tender mainly in the right lower quadrant, incisions without surrounding erythema, skin glue remains intact around most incisions, a few are open 2 to 3 mm with fibrinous material, the Pfannenstiel incision is clean, dry and intact without any surrounding erythema  Skin:  Warm, dry, no rash on visualized skin surfaces  Musculoskeletal: Symmetric strength, normal gait  Psychiatric: Alert and oriented ×3, normal affect           Assessment/Plan:  1.  Status post da Scott assisted laparoscopic sigmoidectomy with ongoing postoperative pain.    I recommended getting a CT abdomen and pelvis to rule out pelvic abscess or anastomotic leak.  Patient was sent  directly to CT from our office.  Not long after I was able to see the images and the report.  The anastomosis looks fine without any surrounding fluid, air or abscesses.  She does have a small amount of fluid deep to the Pfannenstiel incision, which is likely a seroma.  She also had an incidental finding of labial abscess versus Bartholin gland cyst.  Reviewed all these findings with the patient and her .  She does report having the cyst in her labia intermittently flaring up.  I recommended following up with her OB/GYN for this.    Follow-up with me in 3 weeks.  I did send a refill of pain medicine today. This will be the last one.       Christine Morse M.D.  General, Robotic and Endoscopic Surgery  [facility]    [address], [address]  [place], [place], [address]  P: [phone]  F: [phone]

## 2024-07-08 ENCOUNTER — TELEPHONE (OUTPATIENT)
Dept: OBSTETRICS AND GYNECOLOGY | Facility: CLINIC | Age: 44
End: 2024-07-08
Payer: COMMERCIAL

## 2024-07-08 ENCOUNTER — APPOINTMENT (OUTPATIENT)
Dept: CT IMAGING | Facility: HOSPITAL | Age: 44
End: 2024-07-08
Payer: COMMERCIAL

## 2024-07-08 ENCOUNTER — HOSPITAL ENCOUNTER (EMERGENCY)
Facility: HOSPITAL | Age: 44
Discharge: HOME | End: 2024-07-08
Attending: EMERGENCY MEDICINE | Admitting: EMERGENCY MEDICINE
Payer: COMMERCIAL

## 2024-07-08 VITALS
SYSTOLIC BLOOD PRESSURE: 118 MMHG | TEMPERATURE: 98.1 F | RESPIRATION RATE: 16 BRPM | DIASTOLIC BLOOD PRESSURE: 78 MMHG | OXYGEN SATURATION: 94 % | HEART RATE: 69 BPM

## 2024-07-08 DIAGNOSIS — Z90.49 S/P LAPAROSCOPIC-ASSISTED SIGMOIDECTOMY: ICD-10-CM

## 2024-07-08 DIAGNOSIS — R10.31 RIGHT LOWER QUADRANT ABDOMINAL PAIN: Primary | ICD-10-CM

## 2024-07-08 DIAGNOSIS — N75.0 BARTHOLIN GLAND CYST: ICD-10-CM

## 2024-07-08 LAB
ALBUMIN SERPL-MCNC: 4.2 G/DL (ref 3.5–5.2)
ALBUMIN/GLOB SERPL: 1.5 G/DL
ALP SERPL-CCNC: 103 U/L (ref 39–117)
ALT SERPL W P-5'-P-CCNC: 12 U/L (ref 1–33)
ANION GAP SERPL CALCULATED.3IONS-SCNC: 13.4 MMOL/L (ref 5–15)
AST SERPL-CCNC: 12 U/L (ref 1–32)
BACTERIA UR QL AUTO: ABNORMAL /HPF
BASOPHILS # BLD AUTO: 0.04 10*3/MM3 (ref 0–0.2)
BASOPHILS NFR BLD AUTO: 0.5 % (ref 0–1.5)
BILIRUB SERPL-MCNC: 0.3 MG/DL (ref 0–1.2)
BILIRUB UR QL STRIP: NEGATIVE
BUN SERPL-MCNC: 6 MG/DL (ref 6–20)
BUN/CREAT SERPL: 9 (ref 7–25)
CALCIUM SPEC-SCNC: 9.1 MG/DL (ref 8.6–10.5)
CHLORIDE SERPL-SCNC: 103 MMOL/L (ref 98–107)
CLARITY UR: CLEAR
CO2 SERPL-SCNC: 22.6 MMOL/L (ref 22–29)
COLOR UR: YELLOW
CREAT SERPL-MCNC: 0.67 MG/DL (ref 0.57–1)
DEPRECATED RDW RBC AUTO: 43.3 FL (ref 37–54)
EGFRCR SERPLBLD CKD-EPI 2021: 110.7 ML/MIN/1.73
EOSINOPHIL # BLD AUTO: 0.24 10*3/MM3 (ref 0–0.4)
EOSINOPHIL NFR BLD AUTO: 2.8 % (ref 0.3–6.2)
ERYTHROCYTE [DISTWIDTH] IN BLOOD BY AUTOMATED COUNT: 13.6 % (ref 12.3–15.4)
GLOBULIN UR ELPH-MCNC: 2.8 GM/DL
GLUCOSE SERPL-MCNC: 100 MG/DL (ref 65–99)
GLUCOSE UR STRIP-MCNC: NEGATIVE MG/DL
HCG SERPL QL: NEGATIVE
HCT VFR BLD AUTO: 41.8 % (ref 34–46.6)
HGB BLD-MCNC: 13.9 G/DL (ref 12–15.9)
HGB UR QL STRIP.AUTO: NEGATIVE
HOLD SPECIMEN: NORMAL
HYALINE CASTS UR QL AUTO: ABNORMAL /LPF
IMM GRANULOCYTES # BLD AUTO: 0.02 10*3/MM3 (ref 0–0.05)
IMM GRANULOCYTES NFR BLD AUTO: 0.2 % (ref 0–0.5)
KETONES UR QL STRIP: ABNORMAL
LEUKOCYTE ESTERASE UR QL STRIP.AUTO: ABNORMAL
LIPASE SERPL-CCNC: 26 U/L (ref 13–60)
LYMPHOCYTES # BLD AUTO: 2.95 10*3/MM3 (ref 0.7–3.1)
LYMPHOCYTES NFR BLD AUTO: 34.8 % (ref 19.6–45.3)
MCH RBC QN AUTO: 29.7 PG (ref 26.6–33)
MCHC RBC AUTO-ENTMCNC: 33.3 G/DL (ref 31.5–35.7)
MCV RBC AUTO: 89.3 FL (ref 79–97)
MONOCYTES # BLD AUTO: 0.53 10*3/MM3 (ref 0.1–0.9)
MONOCYTES NFR BLD AUTO: 6.3 % (ref 5–12)
NEUTROPHILS NFR BLD AUTO: 4.69 10*3/MM3 (ref 1.7–7)
NEUTROPHILS NFR BLD AUTO: 55.4 % (ref 42.7–76)
NITRITE UR QL STRIP: NEGATIVE
NRBC BLD AUTO-RTO: 0 /100 WBC (ref 0–0.2)
PH UR STRIP.AUTO: 5.5 [PH] (ref 5–8)
PLATELET # BLD AUTO: 427 10*3/MM3 (ref 140–450)
PMV BLD AUTO: 9.4 FL (ref 6–12)
POTASSIUM SERPL-SCNC: 3.4 MMOL/L (ref 3.5–5.2)
PROT SERPL-MCNC: 7 G/DL (ref 6–8.5)
PROT UR QL STRIP: NEGATIVE
RBC # BLD AUTO: 4.68 10*6/MM3 (ref 3.77–5.28)
RBC # UR STRIP: ABNORMAL /HPF
REF LAB TEST METHOD: ABNORMAL
SODIUM SERPL-SCNC: 139 MMOL/L (ref 136–145)
SP GR UR STRIP: 1.02 (ref 1–1.03)
SQUAMOUS #/AREA URNS HPF: ABNORMAL /HPF
UROBILINOGEN UR QL STRIP: ABNORMAL
WBC # UR STRIP: ABNORMAL /HPF
WBC NRBC COR # BLD AUTO: 8.47 10*3/MM3 (ref 3.4–10.8)
WHOLE BLOOD HOLD COAG: NORMAL
WHOLE BLOOD HOLD SPECIMEN: NORMAL

## 2024-07-08 PROCEDURE — 96374 THER/PROPH/DIAG INJ IV PUSH: CPT

## 2024-07-08 PROCEDURE — 25010000002 MORPHINE PER 10 MG: Performed by: EMERGENCY MEDICINE

## 2024-07-08 PROCEDURE — 99285 EMERGENCY DEPT VISIT HI MDM: CPT

## 2024-07-08 PROCEDURE — 96375 TX/PRO/DX INJ NEW DRUG ADDON: CPT

## 2024-07-08 PROCEDURE — 25010000002 KETOROLAC TROMETHAMINE PER 15 MG: Performed by: EMERGENCY MEDICINE

## 2024-07-08 PROCEDURE — 84703 CHORIONIC GONADOTROPIN ASSAY: CPT | Performed by: EMERGENCY MEDICINE

## 2024-07-08 PROCEDURE — 81001 URINALYSIS AUTO W/SCOPE: CPT | Performed by: EMERGENCY MEDICINE

## 2024-07-08 PROCEDURE — 25510000001 IOPAMIDOL 61 % SOLUTION: Performed by: EMERGENCY MEDICINE

## 2024-07-08 PROCEDURE — 74177 CT ABD & PELVIS W/CONTRAST: CPT

## 2024-07-08 PROCEDURE — 85025 COMPLETE CBC W/AUTO DIFF WBC: CPT | Performed by: EMERGENCY MEDICINE

## 2024-07-08 PROCEDURE — 80053 COMPREHEN METABOLIC PANEL: CPT | Performed by: EMERGENCY MEDICINE

## 2024-07-08 PROCEDURE — 83690 ASSAY OF LIPASE: CPT | Performed by: EMERGENCY MEDICINE

## 2024-07-08 PROCEDURE — 96376 TX/PRO/DX INJ SAME DRUG ADON: CPT

## 2024-07-08 PROCEDURE — 25810000003 SODIUM CHLORIDE 0.9 % SOLUTION: Performed by: EMERGENCY MEDICINE

## 2024-07-08 RX ORDER — MORPHINE SULFATE 2 MG/ML
4 INJECTION, SOLUTION INTRAMUSCULAR; INTRAVENOUS ONCE
Status: COMPLETED | OUTPATIENT
Start: 2024-07-08 | End: 2024-07-08

## 2024-07-08 RX ORDER — KETOROLAC TROMETHAMINE 15 MG/ML
15 INJECTION, SOLUTION INTRAMUSCULAR; INTRAVENOUS ONCE
Status: COMPLETED | OUTPATIENT
Start: 2024-07-08 | End: 2024-07-08

## 2024-07-08 RX ORDER — SODIUM CHLORIDE 0.9 % (FLUSH) 0.9 %
10 SYRINGE (ML) INJECTION AS NEEDED
Status: DISCONTINUED | OUTPATIENT
Start: 2024-07-08 | End: 2024-07-09 | Stop reason: HOSPADM

## 2024-07-08 RX ORDER — DICYCLOMINE HYDROCHLORIDE 10 MG/1
20 CAPSULE ORAL 3 TIMES DAILY PRN
Qty: 21 CAPSULE | Refills: 0 | Status: SHIPPED | OUTPATIENT
Start: 2024-07-08

## 2024-07-08 RX ORDER — DICYCLOMINE HYDROCHLORIDE 10 MG/1
20 CAPSULE ORAL ONCE
Status: COMPLETED | OUTPATIENT
Start: 2024-07-08 | End: 2024-07-08

## 2024-07-08 RX ADMIN — MORPHINE SULFATE 4 MG: 2 INJECTION, SOLUTION INTRAMUSCULAR; INTRAVENOUS at 21:19

## 2024-07-08 RX ADMIN — KETOROLAC TROMETHAMINE 15 MG: 15 INJECTION, SOLUTION INTRAMUSCULAR; INTRAVENOUS at 21:50

## 2024-07-08 RX ADMIN — SODIUM CHLORIDE 1000 ML: 9 INJECTION, SOLUTION INTRAVENOUS at 19:58

## 2024-07-08 RX ADMIN — IOPAMIDOL 85 ML: 612 INJECTION, SOLUTION INTRAVENOUS at 20:56

## 2024-07-08 RX ADMIN — MORPHINE SULFATE 4 MG: 2 INJECTION, SOLUTION INTRAMUSCULAR; INTRAVENOUS at 19:58

## 2024-07-08 RX ADMIN — DICYCLOMINE HYDROCHLORIDE 20 MG: 10 CAPSULE ORAL at 21:50

## 2024-07-08 NOTE — ED PROVIDER NOTES
EMERGENCY DEPARTMENT ENCOUNTER    Room Number:  20/20  PCP: Alissa Bourgeosi MD  Historian: Patient      HPI:  Chief Complaint: Right lower quadrant pain  A complete HPI/ROS/PMH/PSH/SH/FH are unobtainable due to: None    Context: Sushant Lala is a 44 y.o. female who presents to the ED via private vehicle c/o acute progressive and severe right lower quadrant pain.  Patient is status post laparoscopic sigmoidectomy on June 23, 2024 secondary to diverticulitis.  Had CT scan about a week ago that showed abdominal wall fluid collection that was thought to be more of a seroma.  Patient has had increasing pain, no nausea or vomiting.  Has been having bowel movement.  No fevers.  Was directed to the ER for further evaluation.      MEDICAL RECORD REVIEW    External (non-ED) record review: CT abdomen and pelvis July 2, 2024 shows a thin fluid collection in the anterior pelvic wall subcutaneous fat, possible postoperative seroma or hematoma, abscess not entirely excluded              PAST MEDICAL HISTORY  Active Ambulatory Problems     Diagnosis Date Noted    Pregnant 08/25/2016    Pregnancy 09/23/2016    Diverticulitis 03/05/2024    Depression 03/05/2024    LLQ abdominal pain 03/05/2024    Slow transit constipation 03/06/2024     Resolved Ambulatory Problems     Diagnosis Date Noted    Dehydration 03/06/2024     Past Medical History:   Diagnosis Date    Advanced maternal age in multigravida     Allergic rhinitis     Anxiety     Arthritis     Chronic back pain     Diarrhea     Diverticulitis of colon     Drug abuse, marijuana     Panic attacks     Uterine mass          PAST SURGICAL HISTORY  Past Surgical History:   Procedure Laterality Date    ADENOIDECTOMY      COLON RESECTION N/A 6/20/2024    Procedure: COLON RESECTION LAPAROSCOPIC SIGMOID WITH DAVINCI ROBOT, MOBLIZATION OF SPLENIC FLEXURE;  Surgeon: Christine Morse MD;  Location: [facility];  Service: Robotics - DaVinci;  Laterality: N/A;    COLONOSCOPY N/A      normal per patient    COLONOSCOPY N/A 05/08/2024    Procedure: COLONOSCOPY to cecum;  Surgeon: Christine Morse MD;  Location: [facility] ENDOSCOPY;  Service: General;  Laterality: N/A;  pre diverticulitis  post diveerticulosis    D & C HYSTEROSCOPY ENDOMETRIAL RESECTION      MYRINGOTOMY W/ TUBES      TONSILLECTOMY           FAMILY HISTORY  Family History   Problem Relation Age of Onset    Breast cancer Mother     Hypertension Mother     Diabetes Mother     Asthma Mother     COPD Mother     Diabetes Father     Breast cancer Maternal Grandmother     Kidney failure Paternal Grandmother     Diabetes Son     Colon cancer Neg Hx     Malig Hyperthermia Neg Hx          SOCIAL HISTORY  Social History     Socioeconomic History    Marital status:    Tobacco Use    Smoking status: Every Day     Current packs/day: 0.50     Types: Cigarettes    Smokeless tobacco: Never    Tobacco comments:     discussed working to quit from smoking   Vaping Use    Vaping status: Never Used   Substance and Sexual Activity    Alcohol use: Not Currently     Alcohol/week: 1.0 standard drink of alcohol     Types: 1 Cans of beer per week    Drug use: Yes     Types: Marijuana     Comment: 4 TIMES WEEKLY    Sexual activity: Yes     Partners: Male     Birth control/protection: I.U.D.     Comment: vasectomy         ALLERGIES  Penicillins, Levaquin [levofloxacin], and Metronidazole        REVIEW OF SYSTEMS  Review of Systems     All systems reviewed and negative except for those discussed in HPI.       PHYSICAL EXAM    I have reviewed the triage vital signs and nursing notes.    ED Triage Vitals [07/08/24 1748]   Temp Heart Rate Resp BP SpO2   98.1 °F (36.7 °C) 116 16 119/87 98 %      Temp src Heart Rate Source Patient Position BP Location FiO2 (%)   Tympanic Monitor -- -- --       Physical Exam  General: No acute distress, nontoxic  HEENT: Mucous membranes moist, atraumatic, EOMI  Neck: Full ROM  Pulm: Symmetric chest rise, nonlabored, lungs  CTAB  Cardiovascular: Regular rate and rhythm, intact distal pulses  GI: Soft, right lower quadrant tenderness to palpation, nondistended, no rebound, no guarding, bowel sounds present  MSK: Full ROM, no deformity  Skin: Warm, dry  Neuro: Awake, alert, oriented x 4, GCS 15, moving all extremities, no focal deficits  Psych: Calm, cooperative        LAB RESULTS  Recent Results (from the past 24 hour(s))   Urinalysis With Microscopic If Indicated (No Culture) - Urine, Clean Catch    Collection Time: 07/08/24  7:42 PM    Specimen: Urine, Clean Catch   Result Value Ref Range    Color, UA Yellow Yellow, Straw    Appearance, UA Clear Clear    pH, UA 5.5 5.0 - 8.0    Specific Gravity, UA 1.023 1.005 - 1.030    Glucose, UA Negative Negative    Ketones, UA Trace (A) Negative    Bilirubin, UA Negative Negative    Blood, UA Negative Negative    Protein, UA Negative Negative    Leuk Esterase, UA Small (1+) (A) Negative    Nitrite, UA Negative Negative    Urobilinogen, UA 1.0 E.U./dL 0.2 - 1.0 E.U./dL   Urinalysis, Microscopic Only - Urine, Clean Catch    Collection Time: 07/08/24  7:42 PM    Specimen: Urine, Clean Catch   Result Value Ref Range    RBC, UA 0-2 None Seen, 0-2 /HPF    WBC, UA 0-2 None Seen, 0-2 /HPF    Bacteria, UA None Seen None Seen /HPF    Squamous Epithelial Cells, UA 3-6 (A) None Seen, 0-2 /HPF    Hyaline Casts, UA 0-2 None Seen /LPF    Methodology Automated Microscopy    Comprehensive Metabolic Panel    Collection Time: 07/08/24  7:45 PM    Specimen: Blood   Result Value Ref Range    Glucose 100 (H) 65 - 99 mg/dL    BUN 6 6 - 20 mg/dL    Creatinine 0.67 0.57 - 1.00 mg/dL    Sodium 139 136 - 145 mmol/L    Potassium 3.4 (L) 3.5 - 5.2 mmol/L    Chloride 103 98 - 107 mmol/L    CO2 22.6 22.0 - 29.0 mmol/L    Calcium 9.1 8.6 - 10.5 mg/dL    Total Protein 7.0 6.0 - 8.5 g/dL    Albumin 4.2 3.5 - 5.2 g/dL    ALT (SGPT) 12 1 - 33 U/L    AST (SGOT) 12 1 - 32 U/L    Alkaline Phosphatase 103 39 - 117 U/L    Total  Bilirubin 0.3 0.0 - 1.2 mg/dL    Globulin 2.8 gm/dL    A/G Ratio 1.5 g/dL    BUN/Creatinine Ratio 9.0 7.0 - 25.0    Anion Gap 13.4 5.0 - 15.0 mmol/L    eGFR 110.7 >60.0 mL/min/1.73   Lipase    Collection Time: 07/08/24  7:45 PM    Specimen: Blood   Result Value Ref Range    Lipase 26 13 - 60 U/L   hCG, Serum, Qualitative    Collection Time: 07/08/24  7:45 PM    Specimen: Blood   Result Value Ref Range    HCG Qualitative Negative Negative   Green Top (Gel)    Collection Time: 07/08/24  7:45 PM   Result Value Ref Range    Extra Tube Hold for add-ons.    Lavender Top    Collection Time: 07/08/24  7:45 PM   Result Value Ref Range    Extra Tube hold for add-on    Light Blue Top    Collection Time: 07/08/24  7:45 PM   Result Value Ref Range    Extra Tube Hold for add-ons.    CBC Auto Differential    Collection Time: 07/08/24  7:45 PM    Specimen: Blood   Result Value Ref Range    WBC 8.47 3.40 - 10.80 10*3/mm3    RBC 4.68 3.77 - 5.28 10*6/mm3    Hemoglobin 13.9 12.0 - 15.9 g/dL    Hematocrit 41.8 34.0 - 46.6 %    MCV 89.3 79.0 - 97.0 fL    MCH 29.7 26.6 - 33.0 pg    MCHC 33.3 31.5 - 35.7 g/dL    RDW 13.6 12.3 - 15.4 %    RDW-SD 43.3 37.0 - 54.0 fl    MPV 9.4 6.0 - 12.0 fL    Platelets 427 140 - 450 10*3/mm3    Neutrophil % 55.4 42.7 - 76.0 %    Lymphocyte % 34.8 19.6 - 45.3 %    Monocyte % 6.3 5.0 - 12.0 %    Eosinophil % 2.8 0.3 - 6.2 %    Basophil % 0.5 0.0 - 1.5 %    Immature Grans % 0.2 0.0 - 0.5 %    Neutrophils, Absolute 4.69 1.70 - 7.00 10*3/mm3    Lymphocytes, Absolute 2.95 0.70 - 3.10 10*3/mm3    Monocytes, Absolute 0.53 0.10 - 0.90 10*3/mm3    Eosinophils, Absolute 0.24 0.00 - 0.40 10*3/mm3    Basophils, Absolute 0.04 0.00 - 0.20 10*3/mm3    Immature Grans, Absolute 0.02 0.00 - 0.05 10*3/mm3    nRBC 0.0 0.0 - 0.2 /100 WBC       Ordered the above labs and independently interpreted results. My findings will be discussed in the medical decision making section below        RADIOLOGY  CT Abdomen Pelvis With  Contrast    Result Date: 7/8/2024  CT OF THE ABDOMEN/PELVIS WITH CONTRAST  HISTORY: Right lower quadrant pain. HISTORY of right hemicolectomy.  COMPARISON: July 2, 2024  TECHNIQUE: Axial CT imaging was obtained through the abdomen and pelvis. IV contrast was administered.  FINDINGS: Images through the lung bases demonstrate some scarring within the lingula. No suspicious hepatic lesions are seen. The stomach, duodenum, adrenal glands, spleen, pancreas, and gallbladder appear normal. There are aortoiliac calcifications. The kidneys enhance symmetrically. There is no hydronephrosis. There is a simple appearing right renal cyst. No additional follow-up is necessary. Urinary bladder is contracted. Intrauterine device is noted. It appears to be appropriately positioned. There are to small left ovarian cysts, which range in size from 7 mm to 1.5 cm. There are changes of prior sigmoid resection. There is some mild presacral stranding, with some additional stranding noted along the left pelvic sidewall, in keeping with recent surgery. No acute osseous abnormalities are seen. On prior exam, the patient was noted to have a small fluid collection within the lower anterior abdominal wall. This has almost completely resolved. In keeping with recent surgery. It appears similar to the prior study. No abscess is seen. There is no bowel obstruction. There is colonic diverticulosis. The appendix is normal. Pelvic free fluid has decreased.      Changes of sigmoid resection. No evidence of abscess or bowel obstruction. Previously noted fluid collection within the anterior abdominal wall has almost completely resolved.  Radiation dose reduction techniques were utilized, including automated exposure control and exposure modulation based on body size.   This report was finalized on 7/8/2024 9:27 PM by Dr. Denisha Coon M.D on Workstation: [id number]       Ordered the above noted radiological studies.  Independently interpreted by me  and my independent review of findings can be found in the ED Course.  See dictation for official radiology interpretation.      PROCEDURES    Procedures        MEDICATIONS GIVEN IN ER    Medications   sodium chloride 0.9 % flush 10 mL (has no administration in time range)   sodium chloride 0.9 % bolus 1,000 mL ( Intravenous Currently Infusing 7/8/24 2119)   morphine injection 4 mg (4 mg Intravenous Given 7/8/24 1958)   iopamidol (ISOVUE-300) 61 % injection 100 mL (85 mL Intravenous Given 7/8/24 2056)   morphine injection 4 mg (4 mg Intravenous Given 7/8/24 2119)   ketorolac (TORADOL) injection 15 mg (15 mg Intravenous Given 7/8/24 2150)   dicyclomine (BENTYL) capsule 20 mg (20 mg Oral Given 7/8/24 2150)         PROGRESS, DATA ANALYSIS, CONSULTS, AND MEDICAL DECISION MAKING    Please note that this section constitutes my independent interpretation of clinical data including lab results, radiology, EKG's.  This constitutes my independent professional opinion regarding differential diagnosis and management of this patient.  It may include any factors such as history from outside sources, review of external records, social determinants of health, management of medications, response to those treatments, and discussions with other providers.    Differential Diagnosis and Plan: Initial concern for expanding area of fluid collection in the anterior abdominal wall, bowel obstruction, ileus, dehydration, renal failure, electrolyte abnormalities, among others.  The previously noted cystic area in the vaginal wall is unlikely be related given the area of pain not in close proximity.  Will obtain labs, CT scan, supportive care, reevaluate with results.    Additional sources:  - Discussed/ obtained information from independent historians:       - (Social Determinants of Health): None     - Shared decision making:  Patient and family at bedside fully updated on and in agreement with the course and plan moving forward    ED Course  as of 07/08/24 2209 Mon Jul 08, 2024 2002 WBC: 8.47 [DC]   2002 Hemoglobin: 13.9 [DC]   2002 Platelets: 427 [DC]   2004 Bacteria, UA: None Seen [DC]   2004 WBC, UA: 0-2 [DC]   2004 RBC, UA: 0-2 [DC]   2004 Nitrite, UA: Negative [DC]   2004 Leukocytes, UA(!): Small (1+) [DC]   2004 Blood, UA: Negative [DC]   2004 Ketones, UA(!): Trace [DC]   2109 Glucose(!): 100 [DC]   2109 BUN: 6 [DC]   2109 Creatinine: 0.67 [DC]   2109 Sodium: 139 [DC]   2109 Potassium(!): 3.4 [DC]   2110 ALT (SGPT): 12 [DC]   2110 AST (SGOT): 12 [DC]   2110 Alkaline Phosphatase: 103 [DC]   2110 Total Bilirubin: 0.3 [DC]   2110 Lipase: 26 [DC]   2110 CT Abdomen Pelvis With Contrast  Improving anterior abdominal wall fluid collection noted, report persistent left vaginal wall cystic area, no overtly obvious urolithiasis [DC]   2131 CT Abdomen Pelvis With Contrast  Radiology report reviewed, no evidence of any acute emergent findings [DC]   2141 Patient updated on the reassuring workup today, at this point she is safe for discharge with continued outpatient supportive care and outpatient gynecology and surgical follow-up.  ED return for worsening symptoms as needed.  At this point no evidence of any infection, obstruction, or acute surgical process. [DC]      ED Course User Index  [DC] Marco A Allan MD       Hospitalization Considered?: yes, however, no signs of any infection, obstruction, or acute surgical process, safe for discharge with outpatient follow-up    Orders Placed During This Visit:  Orders Placed This Encounter   Procedures    CT Abdomen Pelvis With Contrast    [place] Draw    Comprehensive Metabolic Panel    Lipase    Urinalysis With Microscopic If Indicated (No Culture) - Urine, Clean Catch    hCG, Serum, Qualitative    CBC Auto Differential    Urinalysis, Microscopic Only - Urine, Clean Catch    NPO Diet NPO Type: Strict NPO    Undress & Gown    Insert Peripheral IV    CBC & Differential    Green Top (Gel)    Lavender Top     Light Blue Top       Additional orders considered but not placed:      Independent interpretation of labs, radiology studies, and discussions with consultants: See ED Course        AS OF 22:09 EDT VITALS:    BP - 118/78  HR - 69  TEMP - 98.1 °F (36.7 °C) (Tympanic)  02 SATS - 94%          DIAGNOSIS  Final diagnoses:   Right lower quadrant abdominal pain   S/P laparoscopic-assisted sigmoidectomy   Bartholin gland cyst         DISPOSITION  ED Disposition       ED Disposition   Discharge    Condition   Stable    Comment   --                Please note that portions of this document were completed with a voice recognition program.    Note Disclaimer: At [facility], we believe that sharing information builds trust and better relationships. You are receiving this note because you recently visited [facility]. It is possible you will see health information before a provider has talked with you about it. This kind of information can be easy to misunderstand. To help you fully understand what it means for your health, we urge you to discuss this note with your provider.                       Marco A Allan MD  07/09/24 0201

## 2024-07-08 NOTE — TELEPHONE ENCOUNTER
"Patient called with severe pelvic pain \"10 out of a 10\". Patient recently in ED and CT showed \"well-circumscribed 2.8 cm slightly high attenuation collection  along the posterior left side of the lower vaginal wall, possible  Bartholin gland cyst or abscess\"  Dr. Marcum recommended to to ED due to patient's history of diverticulitis and level of pain. Patient gave verbal understanding.  "

## 2024-07-08 NOTE — TELEPHONE ENCOUNTER
Nerissa    I am looking at her chart and she has had a colon resection in the past 3 weeks.  Any pain is likely related to that procedure and not gynecologic.    If she is having pain at the opening of her vagina, that could be from a Bartholin gland and we could strictly evaluate that.  If this is the case, she can be added to my schedule on Wednesday or tomorrow at 1 pm.    Thanks    Trixie

## 2024-07-09 NOTE — DISCHARGE INSTRUCTIONS
Follow-up with general surgery and OB/GYN as discussed for follow-up on your issues, stay well-hydrated, continue current medications, ED return for worsening symptoms as needed.

## 2024-07-23 ENCOUNTER — OFFICE VISIT (OUTPATIENT)
Dept: SURGERY | Facility: CLINIC | Age: 44
End: 2024-07-23
Payer: COMMERCIAL

## 2024-07-23 VITALS
DIASTOLIC BLOOD PRESSURE: 88 MMHG | WEIGHT: 159.6 LBS | HEIGHT: 64 IN | BODY MASS INDEX: 27.25 KG/M2 | SYSTOLIC BLOOD PRESSURE: 120 MMHG

## 2024-07-23 DIAGNOSIS — Z90.49 S/P LAPAROSCOPIC-ASSISTED SIGMOIDECTOMY: Primary | ICD-10-CM

## 2024-07-23 PROCEDURE — 99024 POSTOP FOLLOW-UP VISIT: CPT | Performed by: SURGERY

## 2024-07-23 NOTE — PROGRESS NOTES
General Surgery Post-Operative Follow Up Note     History of Present Illness:    Sushant Lala is a 44 y.o. year old female who presents for post-operative follow up from laparoscopic assisted sigmoidectomy.  Since her last office visit, she reports her pain has been improving and she feels much better.  She still has some pain in her lower abdomen with bending over at the waist.  No issues with nausea, vomiting or having bowel movements.    Procedure:    Da Scott assisted laparoscopic sigmoidectomy 6/20/2024      Physical Exam:   Constitutional: Well-developed well-nourished, no acute distress  Eyes: Conjunctiva normal, sclera nonicteric  ENMT: Hearing grossly normal, oral mucosa moist  Respiratory: No increased work of breathing, normal inspiratory effort  Cardiovascular: Regular rate, no peripheral edema, no jugular venous distention  Gastrointestinal: Soft, nontender, incisions healing well with small scabs in most of them.  No surrounding erythema or induration  Skin:  Warm, dry, no rash on visualized skin surfaces  Musculoskeletal: Symmetric strength, normal gait  Psychiatric: Alert and oriented ×3, normal affect              Assessment/Plan:  1.  History of chronic diverticulitis status post sigmoidectomy  -I counseled patient on maintaining a high-fiber healthy diet to avoid constipation and recurrent diverticulitis.    -Okay to return to work.  She plans to avoid heavy lifting for a while longer.  When she feels up to it, it is okay from my perspective  -Repeat screening colonoscopy in 10 years  -Follow-up with me as needed      Christine Morse M.D.  General, Robotic and Endoscopic Surgery  [facility]    [address], [address]  [place], [place], [address]  P: [phone]  F: [phone]

## 2025-01-17 ENCOUNTER — APPOINTMENT (OUTPATIENT)
Dept: GENERAL RADIOLOGY | Facility: HOSPITAL | Age: 45
End: 2025-01-17
Payer: COMMERCIAL

## 2025-01-17 ENCOUNTER — HOSPITAL ENCOUNTER (EMERGENCY)
Facility: HOSPITAL | Age: 45
Discharge: HOME | End: 2025-01-17
Attending: EMERGENCY MEDICINE
Payer: COMMERCIAL

## 2025-01-17 VITALS
SYSTOLIC BLOOD PRESSURE: 141 MMHG | OXYGEN SATURATION: 96 % | TEMPERATURE: 98.1 F | WEIGHT: 169 LBS | BODY MASS INDEX: 31.1 KG/M2 | HEIGHT: 62 IN | DIASTOLIC BLOOD PRESSURE: 74 MMHG | HEART RATE: 76 BPM | RESPIRATION RATE: 18 BRPM

## 2025-01-17 DIAGNOSIS — S60.222A CONTUSION OF LEFT HAND INCLUDING FINGERS, INITIAL ENCOUNTER: Primary | ICD-10-CM

## 2025-01-17 DIAGNOSIS — S60.00XA CONTUSION OF LEFT HAND INCLUDING FINGERS, INITIAL ENCOUNTER: Primary | ICD-10-CM

## 2025-01-17 PROCEDURE — 99283 EMERGENCY DEPT VISIT LOW MDM: CPT

## 2025-01-17 PROCEDURE — 73130 X-RAY EXAM OF HAND: CPT

## 2025-01-17 RX ORDER — ACETAMINOPHEN 500 MG
1000 TABLET ORAL ONCE
Status: COMPLETED | OUTPATIENT
Start: 2025-01-17 | End: 2025-01-17

## 2025-01-17 RX ORDER — IBUPROFEN 800 MG/1
800 TABLET, FILM COATED ORAL ONCE
Status: COMPLETED | OUTPATIENT
Start: 2025-01-17 | End: 2025-01-17

## 2025-01-17 RX ADMIN — IBUPROFEN 800 MG: 800 TABLET, FILM COATED ORAL at 11:54

## 2025-01-17 RX ADMIN — ACETAMINOPHEN 1000 MG: 500 TABLET, FILM COATED ORAL at 11:54

## 2025-01-17 NOTE — ED PROVIDER NOTES
EMERGENCY DEPARTMENT ENCOUNTER      PCP: Alissa Bourgeois MD  Patient Care Team:  Alissa Bourgeois MD as PCP - General (Internal Medicine)   Independent Historians: Patient    HPI:  Chief Complaint: Left hand injury   A complete HPI/ROS/PMH/PSH/SH/FH are unobtainable due to: None    Chronic or social conditions impacting patient care (social determinants of health): None    Context: Sushant Lala is a 44 y.o. female who presents to the ED c/o acute left hand pain after shutting in a car door prior to arrival. She denies previous injury or surgery to the left hand. Denies other injury. She has not taken anything for pain at this time.    Review of prior external notes and/or external test results outside of this encounter: CMP on 7/8/24 showed normal creatinine, mild hypokalemia of 3.4.  CBC on same date was unremarkable.       PAST MEDICAL HISTORY  Active Ambulatory Problems     Diagnosis Date Noted    Pregnant 08/25/2016    Pregnancy 09/23/2016    Diverticulitis 03/05/2024    Depression 03/05/2024    LLQ abdominal pain 03/05/2024    Slow transit constipation 03/06/2024     Resolved Ambulatory Problems     Diagnosis Date Noted    Dehydration 03/06/2024     Past Medical History:   Diagnosis Date    Advanced maternal age in multigravida     Allergic rhinitis     Anxiety     Arthritis     Chronic back pain     Diarrhea     Diverticulitis of colon     Drug abuse, marijuana     Panic attacks     Uterine mass        The patient has started, but not completed, their COVID-19 vaccination series.    PAST SURGICAL HISTORY  Past Surgical History:   Procedure Laterality Date    ADENOIDECTOMY      COLON RESECTION N/A 6/20/2024    Procedure: COLON RESECTION LAPAROSCOPIC SIGMOID WITH DAVINCI ROBOT, MOBLIZATION OF SPLENIC FLEXURE;  Surgeon: Christine Morse MD;  Location: [facility];  Service: Robotics - DaVinci;  Laterality: N/A;    COLONOSCOPY N/A     normal per patient    COLONOSCOPY N/A 05/08/2024    Procedure:  COLONOSCOPY to cecum;  Surgeon: Christine Morse MD;  Location: [facility] ENDOSCOPY;  Service: General;  Laterality: N/A;  pre diverticulitis  post diveerticulosis    D & C HYSTEROSCOPY ENDOMETRIAL RESECTION      MYRINGOTOMY W/ TUBES      TONSILLECTOMY           FAMILY HISTORY  Family History   Problem Relation Age of Onset    Breast cancer Mother     Hypertension Mother     Diabetes Mother     Asthma Mother     COPD Mother     Diabetes Father     Breast cancer Maternal Grandmother     Kidney failure Paternal Grandmother     Diabetes Son     Colon cancer Neg Hx     Malig Hyperthermia Neg Hx          SOCIAL HISTORY  Social History     Socioeconomic History    Marital status:    Tobacco Use    Smoking status: Every Day     Current packs/day: 0.50     Types: Cigarettes    Smokeless tobacco: Never    Tobacco comments:     discussed working to quit from smoking   Vaping Use    Vaping status: Never Used   Substance and Sexual Activity    Alcohol use: Not Currently     Alcohol/week: 1.0 standard drink of alcohol     Types: 1 Cans of beer per week    Drug use: Yes     Types: Marijuana     Comment: 4 TIMES WEEKLY    Sexual activity: Yes     Partners: Male     Birth control/protection: I.U.D.     Comment: vasectomy         ALLERGIES  Penicillins, Levaquin [levofloxacin], and Metronidazole        REVIEW OF SYSTEMS  Review of Systems   Musculoskeletal:  Positive for arthralgias (Left hand).        All systems reviewed and negative except for those discussed in HPI.       PHYSICAL EXAM    I have reviewed the triage vital signs and nursing notes.    ED Triage Vitals [01/17/25 1118]   Temp Heart Rate Resp BP SpO2   98.1 °F (36.7 °C) 76 18 146/91 95 %      Temp src Heart Rate Source Patient Position BP Location FiO2 (%)   -- -- Sitting Right arm --       Physical Exam  GENERAL: alert, no acute distress  SKIN: Warm, dry  HENT: Normocephalic, atraumatic  EYES: no scleral icterus  CV: regular rhythm, regular rate  RESPIRATORY:  normal effort, lungs clear  ABDOMEN: soft, nontender, nondistended  MUSCULOSKELETAL: pain across the MCP joints of the L hand, no obvious deformity, laceration or bruising, decreased ROM secondary to pain  NEURO: alert, moves all extremities, follows commands          LAB RESULTS  No results found for this or any previous visit (from the past 24 hours).    Ordered the above labs and independently reviewed and interpreted the results.        RADIOLOGY  XR Hand 3+ View Left    Result Date: 1/17/2025  XR HAND 3+ VW LEFT-  INDICATION: Left hand shut in car door  COMPARISON: None available      No fracture. Normal alignment. Preserved joint spaces.  This report was finalized on 1/17/2025 12:27 PM by Dr. Israel Arizmendi M.D on Workstation: [id number]       I ordered the above noted radiological studies. Independently reviewed and interpreted by me.  See dictation for official radiology interpretation.      PROCEDURES    Procedures      MEDICATIONS GIVEN IN ER    Medications   acetaminophen (TYLENOL) tablet 1,000 mg (1,000 mg Oral Given 1/17/25 1154)   ibuprofen (ADVIL,MOTRIN) tablet 800 mg (800 mg Oral Given 1/17/25 1154)         PROGRESS, DATA ANALYSIS, CONSULTS, AND MEDICAL DECISION MAKING    All labs have been independently reviewed and interpreted by me.  All radiology studies have been independently reviewed and interpreted by me and discussed with radiologist dictating the report.   EKG's independently reviewed and interpreted by me.  Discussion below represents my analysis of pertinent findings related to patient's condition, differential diagnosis, treatment plan and final disposition.    Differential diagnosis: contusion, fracture, dislocation    ED Course as of 01/17/25 1247   Fri Jan 17, 2025   1236 XR Hand 3+ View Left  Radiology study independently interpreted by me and my findings are no acute fracture.   [DC]      ED Course User Index  [DC] Eneida Miles PA             AS OF 12:47 EST VITALS:    BP -  146/91  HR - 76  TEMP - 98.1 °F (36.7 °C)  O2 SATS - 95%        DIAGNOSIS  Final diagnoses:   Contusion of left hand including fingers, initial encounter         DISPOSITION  ED Disposition       ED Disposition   Discharge    Condition   Stable    Comment   --                  Note Disclaimer: At [facility], we believe that sharing information builds trust and better relationships. You are receiving this note because you recently visited [facility]. It is possible you will see health information before a provider has talked with you about it. This kind of information can be easy to misunderstand. To help you fully understand what it means for your health, we urge you to discuss this note with your provider.         Eneida Miles PA  01/17/25 1246

## 2025-01-17 NOTE — ED NOTES
Patient arrives via pv from home for complaints of a left hand injury. Patient states her left hand was smashed in her car door.

## 2025-01-17 NOTE — Clinical Note
[facility] EMERGENCY DEPARTMENT  [address] [facility] [address]  Phone: [phone]    Sushant Lala was seen and treated in our emergency department on 1/17/2025.  She may return to work on 01/19/2025.         Thank you for choosing [facility].    Eneida Miles PA

## (undated) DEVICE — VESSEL SEALER EXTEND: Brand: ENDOWRIST

## (undated) DEVICE — GLV SURG SENSICARE PI MIC PF SZ6.5 LF STRL

## (undated) DEVICE — APPL CHLORAPREP HI/LITE 26ML ORNG

## (undated) DEVICE — THE STERILE LIGHT HANDLE COVER IS USED WITH STERIS SURGICAL LIGHTING AND VISUALIZATION SYSTEMS.

## (undated) DEVICE — SUT MNCRYL PLS ANTIB UD 4/0 PS2 18IN

## (undated) DEVICE — PREMIUM WET SKIN PREP TRAY: Brand: MEDLINE INDUSTRIES, INC.

## (undated) DEVICE — SUT SILK 2/0 SH 30IN K833H

## (undated) DEVICE — TUBING, SUCTION, 1/4" X 10', STRAIGHT: Brand: MEDLINE

## (undated) DEVICE — STAPLER 60: Brand: SUREFORM

## (undated) DEVICE — LN SMPL CO2 SHTRM SD STREAM W/M LUER

## (undated) DEVICE — SOL ANTISTICK CAUTRY ELECTROLUBE LF

## (undated) DEVICE — 3M™ STERI-DRAPE™ INSTRUMENT POUCH 1018L: Brand: STERI-DRAPE™

## (undated) DEVICE — LAPAROSCOPIC SMOKE FILTRATION SYSTEM: Brand: PALL LAPAROSHIELD® PLUS LAPAROSCOPIC SMOKE FILTRATION SYSTEM

## (undated) DEVICE — LOU LAP SIGMOID COLON: Brand: MEDLINE INDUSTRIES, INC.

## (undated) DEVICE — KT ORCA ORCAPOD DISP STRL

## (undated) DEVICE — SOL NACL 0.9PCT 1000ML

## (undated) DEVICE — LAPAROVUE VISIBILITY SYSTEM LAPAROSCOPIC SOLUTIONS: Brand: LAPAROVUE

## (undated) DEVICE — WOUND RETRACTOR AND PROTECTOR: Brand: ALEXIS WOUND PROTECTOR-RETRACTOR

## (undated) DEVICE — CANN O2 ETCO2 FITS ALL CONN CO2 SMPL A/ 7IN DISP LF

## (undated) DEVICE — SUT PDS 0 CT1 36IN Z346H

## (undated) DEVICE — ENDOCUT SCISSOR TIP, DISPOSABLE: Brand: RENEW

## (undated) DEVICE — Device

## (undated) DEVICE — PENCL ES MEGADINE EZ/CLEAN BUTN W/HOLSTR 10FT

## (undated) DEVICE — COLUMN DRAPE

## (undated) DEVICE — BLADELESS OBTURATOR: Brand: WECK VISTA

## (undated) DEVICE — SENSR O2 OXIMAX FNGR A/ 18IN NONSTR

## (undated) DEVICE — TIP COVER ACCESSORY

## (undated) DEVICE — TROCARS: Brand: KII® OPTICAL ACCESS SYSTEM

## (undated) DEVICE — ST TBG AIRSEAL BIF FLTR W/ACT/CHARCOAL/FLTR

## (undated) DEVICE — JACKSON-PRATT 100CC BULB RESERVOIR: Brand: CARDINAL HEALTH

## (undated) DEVICE — ADAPT CLN BIOGUARD AIR/H2O DISP

## (undated) DEVICE — TROC BLADLES AIRSEAL/OPTI THRD 8X120MM 1P/U

## (undated) DEVICE — ENDOPATH XCEL BLADELESS TROCARS WITH STABILITY SLEEVES: Brand: ENDOPATH XCEL

## (undated) DEVICE — CELLULAR STAPLER WITH TRI-STAPLE TECHNOLOGY
Type: IMPLANTABLE DEVICE | Site: COLON | Status: NON-FUNCTIONAL
Brand: EEA

## (undated) DEVICE — ENDOSCOPE KLEENSPEC ILLUM SYS 19 25CM

## (undated) DEVICE — SEAL

## (undated) DEVICE — TOTAL TRAY, 16FR 10ML SIL FOLEY, URN: Brand: MEDLINE

## (undated) DEVICE — ARM DRAPE

## (undated) DEVICE — GOWN,NON-REINFORCED,SIRUS,SET IN SLV,XL: Brand: MEDLINE